# Patient Record
Sex: FEMALE | Race: BLACK OR AFRICAN AMERICAN | NOT HISPANIC OR LATINO
[De-identification: names, ages, dates, MRNs, and addresses within clinical notes are randomized per-mention and may not be internally consistent; named-entity substitution may affect disease eponyms.]

---

## 2019-05-22 ENCOUNTER — RESULT REVIEW (OUTPATIENT)
Age: 54
End: 2019-05-22

## 2019-08-16 ENCOUNTER — RESULT REVIEW (OUTPATIENT)
Age: 54
End: 2019-08-16

## 2019-08-23 ENCOUNTER — EMERGENCY (EMERGENCY)
Facility: HOSPITAL | Age: 54
LOS: 0 days | Discharge: HOME | End: 2019-08-23
Admitting: EMERGENCY MEDICINE
Payer: MEDICARE

## 2019-08-23 VITALS
TEMPERATURE: 97 F | SYSTOLIC BLOOD PRESSURE: 142 MMHG | RESPIRATION RATE: 18 BRPM | DIASTOLIC BLOOD PRESSURE: 78 MMHG | OXYGEN SATURATION: 100 % | WEIGHT: 184.97 LBS | HEART RATE: 78 BPM

## 2019-08-23 DIAGNOSIS — K64.9 UNSPECIFIED HEMORRHOIDS: ICD-10-CM

## 2019-08-23 DIAGNOSIS — K62.89 OTHER SPECIFIED DISEASES OF ANUS AND RECTUM: ICD-10-CM

## 2019-08-23 PROCEDURE — 99282 EMERGENCY DEPT VISIT SF MDM: CPT

## 2019-08-23 RX ORDER — DOCUSATE SODIUM 100 MG
1 CAPSULE ORAL
Qty: 14 | Refills: 0
Start: 2019-08-23 | End: 2019-09-05

## 2019-08-23 RX ORDER — HYDROCORTISONE 1 %
1 OINTMENT (GRAM) TOPICAL
Qty: 30 | Refills: 0
Start: 2019-08-23 | End: 2019-08-29

## 2019-08-23 NOTE — ED PROVIDER NOTE - NS ED ROS FT
Review of Systems:  	•	CONSTITUTIONAL - no fever, no diaphoresis, no chills  	•	SKIN - no rash  	•	HEMATOLOGIC - no bleeding, no bruising  	•	EYES - no eye pain, no blurry vision  	•	ENT - no congestion  	•	RESPIRATORY - no shortness of breath, no cough  	•	CARDIAC - no chest pain, no palpitations  	•	GI - +painful hemorrhoids, no abd pain, no nausea, no vomiting, no diarrhea, no constipation  	•	GENITO-URINARY - no dysuria; no hematuria, no increased urinary frequency  	•	MUSCULOSKELETAL - no joint paint, no swelling, no redness  	•	NEUROLOGIC - no weakness, no paresthesias  	All other ROS are negative except as documented in HPI.

## 2019-08-23 NOTE — ED PROVIDER NOTE - CLINICAL SUMMARY MEDICAL DECISION MAKING FREE TEXT BOX
Pt with painful hemorrhoids over the last few days. No rectal bleeding. Exam showing tender non-thrombosed external hemorrhoids. Lidocaine 4% given in ED for pain relief. Sent home on Anusol and colace. Advised fu with her gastroenterologist. I have discussed the discharge plan with the patient. The patient agrees with the plan, as discussed.  The patient understands Emergency Department diagnosis is a preliminary diagnosis often based on limited information and that the patient must adhere to the follow-up plan as discussed.  The patient understands that if the symptoms worsen or if prescribed medications do not have the desired/planned effect that the patient may return to the Emergency Department at any time for further evaluation and treatment.

## 2019-08-23 NOTE — ED PROVIDER NOTE - OBJECTIVE STATEMENT
55 yo F with no pmhx presenting for painful hemorrhoids over the past few days. Pain is throbbing, constant, non-radiating. Had recent colonoscopy showing hemorrhoids was given suppository which she had not picked up yet. No bloody bowel movements. No cp, sob, fever, chills, abdominal pain, nausea, vomiting, diarrhea, constipation, rectal bleeding, back pain, urinary symptoms, headache, dizziness, paresthesias, or weakness.

## 2019-08-23 NOTE — ED PROVIDER NOTE - PHYSICAL EXAMINATION
VITAL SIGNS: I have reviewed nursing notes and confirm.  CONSTITUTIONAL: Well-developed; well-nourished; in no acute distress.  SKIN: Skin exam is warm and dry, no acute rash.  HEAD: Normocephalic; atraumatic.  EYES: PERRL, EOM intact; conjunctiva and sclera clear.  ENT: No nasal discharge; airway clear.   NECK: Supple; non tender.  CARD: S1, S2 normal; no murmurs, gallops, or rubs. Regular rate and rhythm.  RESP: Clear to auscultation bilaterally. No wheezes, rales or rhonchi.  ABD: Normal bowel sounds; soft; non-distended; non-tender.   RECTAL chaperoned by BONNIE Kent: Multiple non-thrombosed external hemorrhoids +tenderness. +Internal hemorrhoids noted upon digital exam. No blood. No melena.   EXT: Normal ROM. No edema.  LYMPH: No acute cervical adenopathy.  NEURO: Alert, oriented. Grossly unremarkable. No focal deficits.  PSYCH: Cooperative, appropriate.

## 2019-08-23 NOTE — ED ADULT TRIAGE NOTE - CHIEF COMPLAINT QUOTE
pt sts " my doctor told I have external hemorrhoids and they are hurting me a lot". pt denies any bleeding

## 2019-08-23 NOTE — ED PROVIDER NOTE - NSFOLLOWUPINSTRUCTIONS_ED_ALL_ED_FT
Hemorrhoids    WHAT YOU NEED TO KNOW:    Hemorrhoids are swollen blood vessels inside your rectum (internal hemorrhoids) or on your anus (external hemorrhoids). Sometimes a hemorrhoid may prolapse. This means it extends out of your anus.    DISCHARGE INSTRUCTIONS:    Seek care immediately if:     You have severe pain in your rectum or around your anus.      You have severe pain in your abdomen and you are vomiting.       You have bleeding from your anus that soaks through your underwear.     Contact your healthcare provider if:     You have frequent and painful bowel movements.      Your hemorrhoid looks or feels more swollen than usual.       You do not have a bowel movement for 2 days or more.       You see or feel tissue coming through your anus.       You have questions or concerns about your condition or care.    Medicines: You may need any of the following:     Medicine may be given to decrease pain, swelling, and itching. The medicine may come as a pad, cream, or ointment.       Stool softeners help treat or prevent constipation.       NSAIDs, such as ibuprofen, help decrease swelling, pain, and fever. NSAIDs can cause stomach bleeding or kidney problems in certain people. If you take blood thinner medicine, always ask your healthcare provider if NSAIDs are safe for you. Always read the medicine label and follow directions.      Take your medicine as directed. Contact your healthcare provider if you think your medicine is not helping or if you have side effects. Tell him or her if you are allergic to any medicine. Keep a list of the medicines, vitamins, and herbs you take. Include the amounts, and when and why you take them. Bring the list or the pill bottles to follow-up visits. Carry your medicine list with you in case of an emergency.    Manage your symptoms:     Apply ice on your anus for 15 to 20 minutes every hour or as directed. Use an ice pack, or put crushed ice in a plastic bag. Cover it with a towel before you apply it to your anus. Ice helps prevent tissue damage and decreases swelling and pain.      Take a sitz bath. Fill a bathtub with 4 to 6 inches of warm water. You may also use a sitz bath pan that fits inside a toilet bowl. Sit in the sitz bath for 15 minutes. Do this 3 times a day, and after each bowel movement. The warm water can help decrease pain and swelling.       Keep your anal area clean. Gently wash the area with warm water daily. Soap may irritate the area. After a bowel movement, wipe with moist towelettes or wet toilet paper. Dry toilet paper can irritate the area.     Prevent hemorrhoids:     Do not strain to have a bowel movement. Do not sit on the toilet too long. These actions can increase pressure on the tissues in your rectum and anus.       Drink plenty of liquids. Liquids can help prevent constipation. Ask how much liquid to drink each day and which liquids are best for you.       Eat a variety of high-fiber foods. Examples include fruits, vegetables, and whole grains. Ask your healthcare provider how much fiber you need each day. You may need to take a fiber supplement.            Exercise as directed. Exercise, such as walking, may make it easier to have a bowel movement. Ask your healthcare provider to help you create an exercise plan.       Do not have anal sex. Anal sex can weaken the skin around your rectum and anus.       Avoid heavy lifting. This can cause straining and increase your risk for another hemorrhoid.     Follow up with your healthcare provider as directed: Write down your questions so you remember to ask them during your visits.

## 2019-09-23 ENCOUNTER — INPATIENT (INPATIENT)
Facility: HOSPITAL | Age: 54
LOS: 3 days | Discharge: HOME | End: 2019-09-27
Attending: INTERNAL MEDICINE | Admitting: INTERNAL MEDICINE
Payer: MEDICARE

## 2019-09-23 VITALS
HEART RATE: 83 BPM | TEMPERATURE: 97 F | OXYGEN SATURATION: 100 % | RESPIRATION RATE: 18 BRPM | SYSTOLIC BLOOD PRESSURE: 135 MMHG | DIASTOLIC BLOOD PRESSURE: 67 MMHG

## 2019-09-23 LAB
ALBUMIN SERPL ELPH-MCNC: 3.2 G/DL — LOW (ref 3.5–5.2)
ALP SERPL-CCNC: 335 U/L — HIGH (ref 30–115)
ALT FLD-CCNC: 811 U/L — HIGH (ref 0–41)
ANION GAP SERPL CALC-SCNC: 9 MMOL/L — SIGNIFICANT CHANGE UP (ref 7–14)
APPEARANCE UR: ABNORMAL
APTT BLD: 19.6 SEC — CRITICAL LOW (ref 27–39.2)
AST SERPL-CCNC: 1371 U/L — HIGH (ref 0–41)
BACTERIA # UR AUTO: ABNORMAL
BASE EXCESS BLDV CALC-SCNC: 1.8 MMOL/L — SIGNIFICANT CHANGE UP (ref -2–2)
BILIRUB SERPL-MCNC: 3.2 MG/DL — HIGH (ref 0.2–1.2)
BILIRUB UR-MCNC: ABNORMAL
BUN SERPL-MCNC: 11 MG/DL — SIGNIFICANT CHANGE UP (ref 10–20)
CA-I SERPL-SCNC: 1.2 MMOL/L — SIGNIFICANT CHANGE UP (ref 1.12–1.3)
CALCIUM SERPL-MCNC: 9.5 MG/DL — SIGNIFICANT CHANGE UP (ref 8.5–10.1)
CHLORIDE SERPL-SCNC: 102 MMOL/L — SIGNIFICANT CHANGE UP (ref 98–110)
CO2 SERPL-SCNC: 23 MMOL/L — SIGNIFICANT CHANGE UP (ref 17–32)
COLOR SPEC: ABNORMAL
CREAT SERPL-MCNC: 1 MG/DL — SIGNIFICANT CHANGE UP (ref 0.7–1.5)
DIFF PNL FLD: NEGATIVE — SIGNIFICANT CHANGE UP
EPI CELLS # UR: 9 /HPF — HIGH (ref 0–5)
GAS PNL BLDV: 139 MMOL/L — SIGNIFICANT CHANGE UP (ref 136–145)
GAS PNL BLDV: SIGNIFICANT CHANGE UP
GLUCOSE SERPL-MCNC: 91 MG/DL — SIGNIFICANT CHANGE UP (ref 70–99)
GLUCOSE UR QL: NEGATIVE — SIGNIFICANT CHANGE UP
HCG SERPL QL: NEGATIVE — SIGNIFICANT CHANGE UP
HCO3 BLDV-SCNC: 28 MMOL/L — SIGNIFICANT CHANGE UP (ref 22–29)
HCT VFR BLD CALC: 37.4 % — SIGNIFICANT CHANGE UP (ref 37–47)
HCT VFR BLDA CALC: 41.2 % — SIGNIFICANT CHANGE UP (ref 34–44)
HGB BLD CALC-MCNC: 13.4 G/DL — LOW (ref 14–18)
HGB BLD-MCNC: 11.7 G/DL — LOW (ref 12–16)
HYALINE CASTS # UR AUTO: 5 /LPF — SIGNIFICANT CHANGE UP (ref 0–7)
INR BLD: 1.02 RATIO — SIGNIFICANT CHANGE UP (ref 0.65–1.3)
KETONES UR-MCNC: ABNORMAL
LACTATE BLDV-MCNC: 2.1 MMOL/L — HIGH (ref 0.5–1.6)
LEUKOCYTE ESTERASE UR-ACNC: NEGATIVE — SIGNIFICANT CHANGE UP
LIDOCAIN IGE QN: 25 U/L — SIGNIFICANT CHANGE UP (ref 7–60)
MCHC RBC-ENTMCNC: 21.2 PG — LOW (ref 27–31)
MCHC RBC-ENTMCNC: 31.3 G/DL — LOW (ref 32–37)
MCV RBC AUTO: 67.9 FL — LOW (ref 81–99)
NITRITE UR-MCNC: NEGATIVE — SIGNIFICANT CHANGE UP
NRBC # BLD: 0 /100 WBCS — SIGNIFICANT CHANGE UP (ref 0–0)
PCO2 BLDV: 48 MMHG — SIGNIFICANT CHANGE UP (ref 41–51)
PH BLDV: 7.37 — SIGNIFICANT CHANGE UP (ref 7.26–7.43)
PH UR: 6 — SIGNIFICANT CHANGE UP (ref 5–8)
PLATELET # BLD AUTO: 253 K/UL — SIGNIFICANT CHANGE UP (ref 130–400)
PO2 BLDV: 14 MMHG — LOW (ref 20–40)
POTASSIUM BLDV-SCNC: 4.1 MMOL/L — SIGNIFICANT CHANGE UP (ref 3.3–5.6)
POTASSIUM SERPL-MCNC: 6.8 MMOL/L — CRITICAL HIGH (ref 3.5–5)
POTASSIUM SERPL-SCNC: 6.8 MMOL/L — CRITICAL HIGH (ref 3.5–5)
PROT SERPL-MCNC: 7.6 G/DL — SIGNIFICANT CHANGE UP (ref 6–8)
PROT UR-MCNC: ABNORMAL
PROTHROM AB SERPL-ACNC: 11.7 SEC — SIGNIFICANT CHANGE UP (ref 9.95–12.87)
RBC # BLD: 5.51 M/UL — HIGH (ref 4.2–5.4)
RBC # FLD: 21.7 % — HIGH (ref 11.5–14.5)
RBC CASTS # UR COMP ASSIST: 0 /HPF — SIGNIFICANT CHANGE UP (ref 0–4)
SAO2 % BLDV: 14 % — SIGNIFICANT CHANGE UP
SODIUM SERPL-SCNC: 134 MMOL/L — LOW (ref 135–146)
SP GR SPEC: 1.02 — SIGNIFICANT CHANGE UP (ref 1.01–1.02)
TROPONIN T SERPL-MCNC: <0.01 NG/ML — SIGNIFICANT CHANGE UP
UROBILINOGEN FLD QL: ABNORMAL
WBC # BLD: 5.12 K/UL — SIGNIFICANT CHANGE UP (ref 4.8–10.8)
WBC # FLD AUTO: 5.12 K/UL — SIGNIFICANT CHANGE UP (ref 4.8–10.8)
WBC UR QL: 2 /HPF — SIGNIFICANT CHANGE UP (ref 0–5)

## 2019-09-23 PROCEDURE — 71046 X-RAY EXAM CHEST 2 VIEWS: CPT | Mod: 26

## 2019-09-23 PROCEDURE — 74177 CT ABD & PELVIS W/CONTRAST: CPT | Mod: 26

## 2019-09-23 PROCEDURE — 99285 EMERGENCY DEPT VISIT HI MDM: CPT

## 2019-09-23 PROCEDURE — 93010 ELECTROCARDIOGRAM REPORT: CPT

## 2019-09-23 RX ORDER — LOSARTAN POTASSIUM 100 MG/1
50 TABLET, FILM COATED ORAL DAILY
Refills: 0 | Status: DISCONTINUED | OUTPATIENT
Start: 2019-09-23 | End: 2019-09-27

## 2019-09-23 RX ORDER — CHLORHEXIDINE GLUCONATE 213 G/1000ML
1 SOLUTION TOPICAL
Refills: 0 | Status: DISCONTINUED | OUTPATIENT
Start: 2019-09-23 | End: 2019-09-27

## 2019-09-23 RX ORDER — SODIUM CHLORIDE 9 MG/ML
1000 INJECTION INTRAMUSCULAR; INTRAVENOUS; SUBCUTANEOUS ONCE
Refills: 0 | Status: COMPLETED | OUTPATIENT
Start: 2019-09-23 | End: 2019-09-23

## 2019-09-23 RX ORDER — ENOXAPARIN SODIUM 100 MG/ML
40 INJECTION SUBCUTANEOUS DAILY
Refills: 0 | Status: DISCONTINUED | OUTPATIENT
Start: 2019-09-23 | End: 2019-09-27

## 2019-09-23 RX ADMIN — SODIUM CHLORIDE 1000 MILLILITER(S): 9 INJECTION INTRAMUSCULAR; INTRAVENOUS; SUBCUTANEOUS at 14:19

## 2019-09-23 NOTE — H&P ADULT - ASSESSMENT
# Hepatocellular transaminitis:  - AST 1371, , , Total Bili 3.2 with no differential (Sample is hemolyzed)   - will get repeat hepatic panel in the AM lab and trend daily   - preserved hepatic synthetic function with INR of 1 and albumin 3.2 53 yo F w PMhx of HTN and pre DM presents to the ED for an episode of nausea, diaphoresis, and light-headedness.    # Fatigue in context of Hepatocellular transaminitis:  - AST 1371, , , Total Bili 3.2 with no differential (Sample is hemolyzed)   - CT abdomen with no acute pathology  - preserved hepatic synthetic function with INR of 1 and albumin 3.2  - pt with normal liver function test in 6/2019.  - pt denies recent travel, over counter medication use, IV drug use. pt is monogamous with 1 sexual partner with no protection  - pt endorses occasional Tylenol use but not in excess or n daily bases   - will get repeat hepatic panel in the AM lab and trend daily   - will get hepatitis panel, toxicology screen, acetaminophen level, ferratin,  Autoimmune markers (antinuclear antibodies, anti-smooth muscle antibodies, anti-liver/kidney microsomal antibodies type 1, IgG)  - will get US RUQ With doppler   - GI consult (Dr. Michel Elizabeth)     # HTN: pt take olmesartan 20 mg daily will give losartan 50 mg daily     # DVT ppx: Lovenox  # GI ppx: not indicated  # regular diet  # Dispo: from home

## 2019-09-23 NOTE — H&P ADULT - NSHPPHYSICALEXAM_GEN_ALL_CORE
GENERAL: NAD, lying in bed comfortably  HEAD:  Atraumatic, Normocephalic  EYES: EOMI, PERRLA, conjunctiva and sclera clear  ENT: Moist mucous membranes  NECK: Supple, No JVD  CHEST/LUNG: Clear to auscultation bilaterally; No rales, rhonchi, wheezing, or rubs. Unlabored respirations  HEART: Regular rate and rhythm; No murmurs, rubs, or gallops  ABDOMEN: Bowel sounds present; Soft, Nontender, Nondistended. No hepatomegally  EXTREMITIES:  2+ Peripheral Pulses, brisk capillary refill. No clubbing, cyanosis, or edema  NERVOUS SYSTEM:  Alert & Oriented X3, speech clear. No deficits   MSK: FROM all 4 extremities, full and equal strength  SKIN: No rashes or lesions GENERAL: NAD, lying in bed comfortably  HEAD:  Atraumatic, Normocephalic  EYES: EOMI, PERRLA, mild scleral jaundice   ENT: Moist mucous membranes  NECK: Supple, No JVD  CHEST/LUNG: Clear to auscultation bilaterally; No rales, rhonchi, wheezing, or rubs. Unlabored respirations  HEART: Regular rate and rhythm; No murmurs, rubs, or gallops  ABDOMEN: Bowel sounds present; Soft, Nontender, Nondistended. No hepatomegaly  EXTREMITIES:  2+ Peripheral Pulses, brisk capillary refill. No clubbing, cyanosis, or edema  NERVOUS SYSTEM:  Alert & Oriented X3, speech clear. No deficits   MSK: FROM all 4 extremities, full and equal strength  SKIN: No rashes or lesions

## 2019-09-23 NOTE — ED PROVIDER NOTE - PHYSICAL EXAMINATION
CONSTITUTIONAL: Well-developed; well-nourished; in no acute distress.   SKIN: warm, dry.  HEAD: Normocephalic; atraumatic.  EYES: PERRL, EOMI, no conjunctival erythema; mild scleral icterus.   ENT: No nasal discharge; airway clear.  NECK: Supple; non tender.  CARD: S1, S2 normal; no murmurs, gallops, or rubs. Regular rate and rhythm.   RESP: No wheezes, rales or rhonchi.  ABD: soft nondistended, nontender to palpation.  EXT: Normal ROM.  No clubbing, cyanosis or edema.   NEURO: Alert, oriented, grossly unremarkable.  PSYCH: Cooperative, appropriate.

## 2019-09-23 NOTE — H&P ADULT - HISTORY OF PRESENT ILLNESS
Patient is a 55 yo F w/ hx of HTN p/w episodes of nausea, diaphoresis, and light-headedness. Patient states she has had 2 episodes of sudden onset light-headedness, nausea, and diaphoresis in past 2 weeks without exacerbating events; no chest pain or palpitations during events and tea-colored urine during these episodes. Patient also has had newly diagnosed elevated LFTs as outpatient; following with outpatient GI but does not have a diagnosis as of yet. Patient states she has had urinary frequency, but no fevers or abdominal pain. 53 yo F w PMhx of HTN and pre DM presents to the ED for an episode of nausea, diaphoresis, and light-headedness. Patient reports an episode of sudden onset light-headedness, nausea, and diaphoresis today while ironing her clothes; she had another episode last week with the same complains. no orthostatic hypotension, not exacerbated by head movements. she also endorses fatigue decreased po intake with occasional nausea over the past few weeks. pt also reports dark urine but normal colored stool. pt endorses she newly diagnosed elevated LFTs as outpatient; with work up still in progress. pt denies recent travel, over counter medication use, IV drug use. pt is monogamous with 1 sexual partner with no protection. pt also reports 1 year ago she was seen by rheumatologist after she was referred by ophthalmologist for red eye and working diagnosis was mixed connective tissue disease. pt didn't follow up and currently takes no medications. pt had colonoscopy 8/2019 with 2 polyps and hemorrhoids and was told to follow up in 3 years. pt sees Dr. Glenn Pearson GI.      in the ED, pt is hemodynamically stable.  initial blood work with transaminitis.  CT abdomen with no acute pathology.

## 2019-09-23 NOTE — ED ADULT NURSE NOTE - OBJECTIVE STATEMENT
Pt c/o episodes of dizziness, lightheadedness, and dark urine, pt endorses she was seen at PMD office and had labs done which showered elevated LFTs. Denies cp, sob, abd pain, n/v/d, urinary symptoms, fevers, chills, back pain.

## 2019-09-23 NOTE — ED PROVIDER NOTE - NS ED ROS FT
Constitutional: No fevers.   Eyes:  No visual changes, eye pain or discharge.  ENMT:  No hearing changes, pain, no sore throat or runny nose, no difficulty swallowing  Cardiac:  No chest pain, SOB or edema. No chest pain with exertion. +diaphoresis +light-headedness.   Respiratory:  No cough or respiratory distress. No hemoptysis. No history of asthma or RAD.  GI:  +nausea. No vomiting, diarrhea or abdominal pain. +elevated LFTs.   :  No dysuria, frequency or burning.  MS:  No myalgia, muscle weakness, joint pain or back pain.  Neuro:  No headache or weakness.  No LOC.  Skin:  No skin rash.   Endocrine: No history of thyroid disease or diabetes.

## 2019-09-23 NOTE — ED PROVIDER NOTE - CLINICAL SUMMARY MEDICAL DECISION MAKING FREE TEXT BOX
Patient presented with pre-syncopal symptoms, as well as transaminitis found as outpatient of unclear etiology. Otherwise afebrile, HD stable. Obtained repeat labs which confirmed transaminitis. CT abd/pelvis negative for any emergent causes of transaminitis. In terms of pre-syncope, obtained EKG which showed no signs of cardiac ischemia. Troponin negative and labs otherwise grossly unremarkable. Patient remained stable during ED course. However, will require admission for severe transaminitis along with further work up for her pre-syncope. Patient and family agreeable with plan.

## 2019-09-23 NOTE — ED PROVIDER NOTE - OBJECTIVE STATEMENT
Patient is a 53 yo F w/ hx of HTN p/w episodes of nausea, diaphoresis, and light-headedness. Patient states she has had 2 episodes of sudden onset light-headedness, nausea, and diaphoresis in past 2 weeks without exacerbating events; no chest pain or palpitations during events and tea-colored urine during these episodes. Patient also has had newly diagnosed elevated LFTs as outpatient; following with outpatient GI but does not have a diagnosis as of yet. Patient states she has had urinary frequency, but no fevers or abdominal pain.

## 2019-09-23 NOTE — ED PROVIDER NOTE - ATTENDING CONTRIBUTION TO CARE
55 yo f who had outpatient labs done last week showing markedly elevated LFTs.  Pt was having dark color urine and having episodes of lightheadedness so went to the doctor.  doctor did labs showing elevated LFTS.  no cp, no sob, no abd pain.  no back pain.  no dysuria.  no fevers, no chills.  no dizziness.  no syncope.  awake, alert.  neck supple.  abd soft, nontender.  lungs clear. MMM.  EOMI.  motor/sensation intact in all 4 ext.    p: labs, ekg, cxr, ct, reassess, likely admission.  pt denies any tylenol use.

## 2019-09-23 NOTE — H&P ADULT - ATTENDING COMMENTS
53 YO F with a PMH of HTN and pre-DM who presents to the hospital with a c/o diaphoresis, SOB, and pre-syncope while getting ready for work this AM. The pt states she has had several episodes over the past couple weeks. Associated with dark brown urine. Denies any dysuria, ABD pain, N/V/D, fevers, chills, CP, palpitations, or headaches. No APAP use. Adamantly denies excessive EtOH use (1 wine/weekly). There is a remote hx of right eye redness x 1 year ago for which she was eventully diagnosed with mixed connective-tissue disease and was told that it was self-resolving and she didn't need any treatment.  In the ED, the pt was noted to have elevated LFTS. CT-AP was negative for acute disease.     Of note, the pt is currently being worked up by her PCP (Michel) as out-pt for same. Seen by GI and gave blood work on 9/18 but has not received results.     ROS as above, otherwise neg    Vital Signs Last 24 Hrs  T(C): 36.4 (23 Sep 2019 11:09), Max: 36.4 (23 Sep 2019 11:09)  T(F): 97.6 (23 Sep 2019 11:09), Max: 97.6 (23 Sep 2019 11:09)  HR: 92 (23 Sep 2019 11:09) (83 - 92)  BP: 135/67 (23 Sep 2019 11:09) (135/67 - 135/67)  BP(mean): --  RR: 16 (23 Sep 2019 11:09) (16 - 18)  SpO2: 98% (23 Sep 2019 11:09) (98% - 100%)    General: WN/WD NAD  Neurology: A&Ox3, nonfocal, LOPES x 4  Eyes: PERRL/EOMI  ENT/Neck: Neck supple, trachea midline, No JVD  Respiratory: CTA B/L, No wheezing, rales, rhonchi  CV: RRR, S1S2, No M/G/R  Abdominal: Soft, NT, ND +BS, Obesity  Extremities: No edema, + peripheral pulses  Skin: No Rashes, Hematoma, Ecchymosis    Labs and radiology as above    A/P:    1. Transaminitis from unclear cause  -GI consult   -Send hepatitis work-up  -Agree with toxicology screen, APAP level, ferratin, Autoimmune markers (antinuclear antibodies, anti-smooth muscle antibodies, anti-liver/kidney microsomal antibodies type 1, IgG)  -RUQ US  -Speak with out-pt providers  -IVFs  -Trend liver enzymes    2. Hyperkalemia, hemolyzed sample  -Repeat BMP    3. Hx of HTN and pre-DM  -C/w home meds    GI and DVT PPX    Rest as per above note

## 2019-09-23 NOTE — H&P ADULT - NSHPLABSRESULTS_GEN_ALL_CORE
Complete Blood Count (09.23.19 @ 14:00)    Nucleated RBC: 0 /100 WBCs    WBC Count: 5.12 K/uL    RBC Count: 5.51 M/uL    Hemoglobin: 11.7 g/dL    Hematocrit: 37.4 %    Mean Cell Volume: 67.9 fL    Mean Cell Hemoglobin: 21.2 pg    Mean Cell Hemoglobin Conc: 31.3 g/dL    Red Cell Distrib Width: 21.7 %    Platelet Count - Automated: 253 K/uL    Comprehensive Metabolic Panel (09.23.19 @ 13:10)    Sodium, Serum: 134 mmol/L    Potassium, Serum: 6.8: Specimen is grossly hemolyzed. mmol/L    Chloride, Serum: 102 mmol/L    Carbon Dioxide, Serum: 23 mmol/L    Anion Gap, Serum: 9 mmol/L    Blood Urea Nitrogen, Serum: 11 mg/dL    Creatinine, Serum: 1.0 mg/dL    Glucose, Serum: 91 mg/dL    Calcium, Total Serum: 9.5 mg/dL    Protein Total, Serum: 7.6 g/dL    Albumin, Serum: 3.2 g/dL    Bilirubin Total, Serum: 3.2 mg/dL    Alkaline Phosphatase, Serum: 335: Hemolyzed. Interpret with caution U/L    Aspartate Aminotransferase (AST/SGOT): 1371: Hemolyzed. Interpret with caution U/L    Alanine Aminotransferase (ALT/SGPT): 811: Hemolyzed. Interpret with caution U/L    eGFR if Non : 63: Interpretative comment  The units for eGFR are mL/min/1.73M2 (normalized body surface area). The  eGFR is calculated from a serum creatinine using the CKD-EPI equation.  Other variables required for calculation are race, age and sex. Among  patients with chronic kidney disease (CKD), the eGFR is useful in  determining the stage of disease according to KDOQI CKD classification.  All eGFR results are reported numerically with the following  interpretation.          GFR                    With                 Without     (ml/min/1.73 m2)    Kidney Damage       Kidney Damage        >= 90                    Stage 1                     Normal        60-89                    Stage 2                     Decreased GFR        30-59     Stage 3                     Stage 3        15-29                    Stage 4                     Stage 4        < 15                      Stage 5                     Stage 5  Each stage of CKD assumes that the associated GFR level has been in  effect for at least 3 months. Determination of stages one and two (with  eGFR > 59 ml/min/m2) requires estimation of kidney damage for at least 3  months as defined by structural or functional abnormalities.  Limitations: All estimates of GFR will be less accurate for patients at  extremes of muscle mass (including but not limited to frail elderly,  critically ill, or cancer patients), those with unusual diets, and those  with conditions associated with reduced secretion or extrarenal  elimination of creatinine. The eGFR equation is not recommended for use  in patients with unstable creatinine levels. mL/min/1.73M2    eGFR if African American: 73 mL/min/1.73M2    < from: CT Abdomen and Pelvis w/ IV Cont (09.23.19 @ 17:07) >    IMPRESSION:    No CT evidence of any acute inflammatory process within the abdomen or   pelvis.     Large bulky fibroid uterus, measuring 9.7 x 8.4 x 11.3 cm.    < end of copied text >

## 2019-09-24 LAB
ALBUMIN SERPL ELPH-MCNC: 3.1 G/DL — LOW (ref 3.5–5.2)
ALP SERPL-CCNC: 327 U/L — HIGH (ref 30–115)
ALT FLD-CCNC: 687 U/L — HIGH (ref 0–41)
AMPHET UR-MCNC: NEGATIVE — SIGNIFICANT CHANGE UP
ANION GAP SERPL CALC-SCNC: 10 MMOL/L — SIGNIFICANT CHANGE UP (ref 7–14)
AST SERPL-CCNC: 1211 U/L — HIGH (ref 0–41)
BARBITURATES UR SCN-MCNC: NEGATIVE — SIGNIFICANT CHANGE UP
BENZODIAZ UR-MCNC: NEGATIVE — SIGNIFICANT CHANGE UP
BILIRUB DIRECT SERPL-MCNC: 1.9 MG/DL — HIGH (ref 0–0.2)
BILIRUB INDIRECT FLD-MCNC: 0.7 MG/DL — SIGNIFICANT CHANGE UP (ref 0.2–1.2)
BILIRUB SERPL-MCNC: 2.6 MG/DL — HIGH (ref 0.2–1.2)
BUN SERPL-MCNC: 12 MG/DL — SIGNIFICANT CHANGE UP (ref 10–20)
CALCIUM SERPL-MCNC: 8.8 MG/DL — SIGNIFICANT CHANGE UP (ref 8.5–10.1)
CHLORIDE SERPL-SCNC: 103 MMOL/L — SIGNIFICANT CHANGE UP (ref 98–110)
CHOLEST SERPL-MCNC: 163 MG/DL — SIGNIFICANT CHANGE UP (ref 100–200)
CO2 SERPL-SCNC: 22 MMOL/L — SIGNIFICANT CHANGE UP (ref 17–32)
COCAINE METAB.OTHER UR-MCNC: NEGATIVE — SIGNIFICANT CHANGE UP
CREAT SERPL-MCNC: 0.8 MG/DL — SIGNIFICANT CHANGE UP (ref 0.7–1.5)
FERRITIN SERPL-MCNC: 79 NG/ML — SIGNIFICANT CHANGE UP (ref 15–150)
GLUCOSE BLDC GLUCOMTR-MCNC: 83 MG/DL — SIGNIFICANT CHANGE UP (ref 70–99)
GLUCOSE SERPL-MCNC: 93 MG/DL — SIGNIFICANT CHANGE UP (ref 70–99)
HAV IGM SER-ACNC: SIGNIFICANT CHANGE UP
HBV CORE IGM SER-ACNC: SIGNIFICANT CHANGE UP
HBV SURFACE AG SER-ACNC: SIGNIFICANT CHANGE UP
HCV AB S/CO SERPL IA: 0.14 S/CO — SIGNIFICANT CHANGE UP (ref 0–0.99)
HCV AB SERPL-IMP: SIGNIFICANT CHANGE UP
HDLC SERPL-MCNC: 27 MG/DL — LOW
LIPID PNL WITH DIRECT LDL SERPL: 110 MG/DL — SIGNIFICANT CHANGE UP (ref 4–129)
MAGNESIUM SERPL-MCNC: 2 MG/DL — SIGNIFICANT CHANGE UP (ref 1.8–2.4)
METHADONE UR-MCNC: NEGATIVE — SIGNIFICANT CHANGE UP
OPIATES UR-MCNC: NEGATIVE — SIGNIFICANT CHANGE UP
PCP SPEC-MCNC: SIGNIFICANT CHANGE UP
POTASSIUM SERPL-MCNC: 4.3 MMOL/L — SIGNIFICANT CHANGE UP (ref 3.5–5)
POTASSIUM SERPL-SCNC: 4.3 MMOL/L — SIGNIFICANT CHANGE UP (ref 3.5–5)
PROPOXYPHENE QUALITATIVE URINE RESULT: NEGATIVE — SIGNIFICANT CHANGE UP
PROT SERPL-MCNC: 6.7 G/DL — SIGNIFICANT CHANGE UP (ref 6–8)
SODIUM SERPL-SCNC: 135 MMOL/L — SIGNIFICANT CHANGE UP (ref 135–146)
TOTAL CHOLESTEROL/HDL RATIO MEASUREMENT: 6 RATIO — HIGH (ref 4–5.5)
TRIGL SERPL-MCNC: 116 MG/DL — SIGNIFICANT CHANGE UP (ref 10–149)

## 2019-09-24 PROCEDURE — 99222 1ST HOSP IP/OBS MODERATE 55: CPT | Mod: AI

## 2019-09-24 PROCEDURE — 76705 ECHO EXAM OF ABDOMEN: CPT | Mod: 26

## 2019-09-24 PROCEDURE — 99222 1ST HOSP IP/OBS MODERATE 55: CPT

## 2019-09-24 RX ORDER — SODIUM CHLORIDE 9 MG/ML
1000 INJECTION INTRAMUSCULAR; INTRAVENOUS; SUBCUTANEOUS
Refills: 0 | Status: DISCONTINUED | OUTPATIENT
Start: 2019-09-24 | End: 2019-09-27

## 2019-09-24 RX ORDER — PANTOPRAZOLE SODIUM 20 MG/1
40 TABLET, DELAYED RELEASE ORAL
Refills: 0 | Status: DISCONTINUED | OUTPATIENT
Start: 2019-09-24 | End: 2019-09-27

## 2019-09-24 RX ADMIN — LOSARTAN POTASSIUM 50 MILLIGRAM(S): 100 TABLET, FILM COATED ORAL at 06:00

## 2019-09-24 RX ADMIN — SODIUM CHLORIDE 100 MILLILITER(S): 9 INJECTION INTRAMUSCULAR; INTRAVENOUS; SUBCUTANEOUS at 20:38

## 2019-09-24 RX ADMIN — CHLORHEXIDINE GLUCONATE 1 APPLICATION(S): 213 SOLUTION TOPICAL at 06:00

## 2019-09-24 NOTE — CONSULT NOTE ADULT - SUBJECTIVE AND OBJECTIVE BOX
INTERVENTIONAL RADIOLOGY CONSULT:     Procedure Requested: Nontargeted Liver Biopsy    HPI:  53 yo F w PMhx of HTN and pre DM presents to the ED for an episode of nausea, diaphoresis, and light-headedness. Patient endorses newly diagnosed elevated LFTs as outpatient; with work up still in progress. 1 year ago she was seen by rheumatologist after she was referred by ophthalmologist for red eye and working diagnosis was mixed connective tissue disease.     PAST MEDICAL & SURGICAL HISTORY:  Pre-diabetes  Hypertension  No significant past surgical history    MEDICATIONS  (STANDING):  chlorhexidine 4% Liquid 1 Application(s) Topical <User Schedule>  enoxaparin Injectable 40 milliGRAM(s) SubCutaneous daily  losartan 50 milliGRAM(s) Oral daily    MEDICATIONS  (PRN):      Allergies    tetracycline (Rash)    FAMILY HISTORY:  No pertinent family history in first degree relatives    Physical Exam:   Vital Signs Last 24 Hrs  T(C): 36.1 (24 Sep 2019 08:07), Max: 36.5 (24 Sep 2019 01:00)  T(F): 97 (24 Sep 2019 08:07), Max: 97.7 (24 Sep 2019 01:00)  HR: 70 (24 Sep 2019 08:07) (68 - 74)  BP: 125/70 (24 Sep 2019 08:07) (123/69 - 125/70)  BP(mean): --  RR: 18 (24 Sep 2019 08:07) (18 - 18)  SpO2: 99% (24 Sep 2019 08:07) (99% - 100%)    Labs:                         11.7   5.12  )-----------( 253      ( 23 Sep 2019 14:00 )             37.4     09-24    135  |  103  |  12  ----------------------------<  93  4.3   |  22  |  0.8    Ca    8.8      24 Sep 2019 04:30  Mg     2.0     09-24    TPro  6.7  /  Alb  3.1<L>  /  TBili  2.6<H>  /  DBili  1.9<H>  /  AST  1211<H>  /  ALT  687<H>  /  AlkPhos  327<H>  09-24    PT/INR - ( 23 Sep 2019 12:00 )   PT: 11.70 sec;   INR: 1.02 ratio         PTT - ( 23 Sep 2019 12:00 )  PTT:19.6 sec    Pertinent labs:                      11.7   5.12  )-----------( 253      ( 23 Sep 2019 14:00 )             37.4       09-24    135  |  103  |  12  ----------------------------<  93  4.3   |  22  |  0.8    Ca    8.8      24 Sep 2019 04:30  Mg     2.0     09-24    TPro  6.7  /  Alb  3.1<L>  /  TBili  2.6<H>  /  DBili  1.9<H>  /  AST  1211<H>  /  ALT  687<H>  /  AlkPhos  327<H>  09-24    PT/INR - ( 23 Sep 2019 12:00 )   PT: 11.70 sec;   INR: 1.02 ratio      PTT - ( 23 Sep 2019 12:00 )  PTT:19.6 sec    Radiology & Additional Studies:   < from: CT Abdomen and Pelvis w/ IV Cont (09.23.19 @ 17:07) >  IMPRESSION:    No CT evidence of any acute inflammatory process within the abdomen or   pelvis.     Large bulky fibroid uterus, measuring 9.7 x 8.4 x 11.3 cm.    < end of copied text >    < from: US Abdomen Limited (09.24.19 @ 06:49) >  IMPRESSION:    No cholelithiasis or sonographic evidence of acute cholecystitis. No   biliary ductal dilatation.    < end of copied text >    Radiology imaging reviewed.       ASSESSMENT/ PLAN:   53 yo F w PMhx of HTN and pre DM presents to the ED for an episode of nausea, diaphoresis, and light-headedness. Patient endorses newly diagnosed elevated LFTs as outpatient; with work up still in progress. 1 year ago she was seen by rheumatologist after she was referred by ophthalmologist for red eye and working diagnosis was mixed connective tissue disease. Patient was found to have transaminitis.  - IR was consulted for nontargeted liver biopsy for etiology of tramsaminitis  - On schedule for US-guided liver biopsy tomorrow, 9/25  - Please keep patient NPO past midnight    Thank you for the courtesy of this consult, please call [phone] with any further questions.

## 2019-09-24 NOTE — PROGRESS NOTE ADULT - SUBJECTIVE AND OBJECTIVE BOX
DANNA PERERA  54y, Female  Allergy: tetracycline (Rash)    Hospital Day: 1d    Patient seen and examined earlier today.     PMH/PSH:  PAST MEDICAL & SURGICAL HISTORY:  Pre-diabetes  Hypertension        LAST 24-Hr EVENTS:    VITALS:  T(F): 97 (19 @ 08:07), Max: 97.7 (19 @ 01:00)  HR: 70 (19 @ 08:07)  BP: 125/70 (19 @ 08:07) (123/69 - 125/70)  RR: 18 (19 @ 08:07)  SpO2: 99% (19 @ 08:07)    TESTS & MEASUREMENTS:  Weight (Kg):                             11.7   5.12  )-----------( 253      ( 23 Sep 2019 14:00 )             37.4     PT/INR - ( 23 Sep 2019 12:00 )   PT: 11.70 sec;   INR: 1.02 ratio         PTT - ( 23 Sep 2019 12:00 )  PTT:19.6 sec      135  |  103  |  12  ----------------------------<  93  4.3   |  22  |  0.8    Ca    8.8      24 Sep 2019 04:30  Mg     2.0         TPro  6.7  /  Alb  3.1<L>  /  TBili  2.6<H>  /  DBili  1.9<H>  /  AST  1211<H>  /  ALT  687<H>  /  AlkPhos  327<H>      LIVER FUNCTIONS - ( 24 Sep 2019 04:30 )  Alb: 3.1 g/dL / Pro: 6.7 g/dL / ALK PHOS: 327 U/L / ALT: 687 U/L / AST: 1211 U/L / GGT: x           CARDIAC MARKERS ( 23 Sep 2019 13:10 )  x     / <0.01 ng/mL / x     / x     / x            Urinalysis Basic - ( 23 Sep 2019 14:40 )    Color: Filomena / Appearance: Slightly Turbid / S.022 / pH: x  Gluc: x / Ketone: Small  / Bili: Small / Urobili: 6 mg/dL   Blood: x / Protein: 30 mg/dL / Nitrite: Negative   Leuk Esterase: Negative / RBC: 0 /HPF / WBC 2 /HPF   Sq Epi: x / Non Sq Epi: 9 /HPF / Bacteria: Few        RADIOLOGY & ADDITIONAL TESTS:    EXAM:  CT ABDOMEN AND PELVIS IC        PROCEDURE DATE:  2019    IMPRESSION:    No CT evidence of any acute inflammatory process within the abdomen or   pelvis.     Large bulky fibroid uterus, measuring 9.7 x 8.4 x 11.3 cm.      EXAM:  US ABDOMEN LIMITED        PROCEDURE DATE:  2019   FINDINGS:  LIVER:  Normal in contour and echogenicity measuring 12.2 cm in length.   No focal mass.    GALLBLADDER/BILIARY TREE:  No evidence of cholelithiasis. No wall   thickening or pericholecystic fluid.  Negative sonographic Queen's sign.   No intrahepatic biliary ductal dilatation. The common bile duct measures   4 mm, which is normal.     IMPRESSION:  No cholelithiasis or sonographic evidence of acute cholecystitis. No   biliary ductal dilatation.      MEDICATIONS:  MEDICATIONS  (STANDING):  chlorhexidine 4% Liquid 1 Application(s) Topical <User Schedule>  enoxaparin Injectable 40 milliGRAM(s) SubCutaneous daily  losartan 50 milliGRAM(s) Oral daily  sodium chloride 0.9%. 1000 milliLiter(s) (100 mL/Hr) IV Continuous <Continuous>        HOME MEDICATIONS:  Benicar 20 mg oral tablet ()      PHYSICAL EXAM:  GENERAL: well developed well nourished female resting in no acute distress  NECK: No Swelling  CHEST/LUNG: CTAB no wheezes, rales, or rhonchi  HEART: RRR, No murmurs, rubs, or gallops  ABDOMEN: soft non tender non distended negative Queen's sign, no stigmata of cirrhosis no hepatosplenomegaly    EXTREMITIES:  No clubbing, range of motion intact       1. Transaminitis from unclear cause  -GI consult   -Send hepatitis work-up  -Agree with toxicology screen, APAP level, ferratin, Autoimmune markers (antinuclear antibodies, anti-smooth muscle antibodies, anti-liver/kidney microsomal antibodies type 1, IgG)  -RUQ US  -Speak with out-pt providers  -IVFs  -Trend liver enzymes    2. Hyperkalemia, hemolyzed sample  -Repeat BMP    3. Hx of HTN and pre-DM  -C/w home meds    GI and DVT PPX DANNA PERERA  54y, Female  Allergy: tetracycline (Rash)    Hospital Day: 1d    Patient seen and examined this morning at bedside. pt states that since admission the symptoms which brought her to the ED have significantly improved. pts is primarily concerned about the persistent abnormal liver enzymes. she denies any new medications, alcohol use, or over the counter/supplement use. pt states she was being worked up by an outside GI (Dr. Glenn Pearson) however, does not have the latest blood work results. otherwise patient states she is tolerating diet and getting up and ambulating without symptoms of dizziness.      VITALS:  T(F): 97 (19 @ 08:07), Max: 97.7 (19 @ 01:00)  HR: 70 (19 @ 08:07)  BP: 125/70 (19 @ 08:07) (123/69 - 125/70)  RR: 18 (19 @ 08:07)  SpO2: 99% (19 @ 08:07)      PHYSICAL EXAM:  GENERAL: well developed well nourished female resting in no acute distress  NECK: No Swelling  CHEST/LUNG: CTAB no wheezes, rales, or rhonchi  HEART: RRR, No murmurs, rubs, or gallops  ABDOMEN: soft non tender non distended negative Queen's sign, no stigmata of cirrhosis no hepatosplenomegaly    EXTREMITIES:  No clubbing, range of motion intact     TESTS & MEASUREMENTS:  Weight (Kg):                             11.7   5.12  )-----------( 253      ( 23 Sep 2019 14:00 )             37.4     PT/INR - ( 23 Sep 2019 12:00 )   PT: 11.70 sec;   INR: 1.02 ratio         PTT - ( 23 Sep 2019 12:00 )  PTT:19.6 sec      135  |  103  |  12  ----------------------------<  93  4.3   |  22  |  0.8    Ca    8.8      24 Sep 2019 04:30  Mg     2.0         TPro  6.7  /  Alb  3.1<L>  /  TBili  2.6<H>  /  DBili  1.9<H>  /  AST  1211<H>  /  ALT  687<H>  /  AlkPhos  327<H>      LIVER FUNCTIONS - ( 24 Sep 2019 04:30 )  Alb: 3.1 g/dL / Pro: 6.7 g/dL / ALK PHOS: 327 U/L / ALT: 687 U/L / AST: 1211 U/L / GGT: x           CARDIAC MARKERS ( 23 Sep 2019 13:10 )  x     / <0.01 ng/mL / x     / x     / x            Urinalysis Basic - ( 23 Sep 2019 14:40 )    Color: Filomena / Appearance: Slightly Turbid / S.022 / pH: x  Gluc: x / Ketone: Small  / Bili: Small / Urobili: 6 mg/dL   Blood: x / Protein: 30 mg/dL / Nitrite: Negative   Leuk Esterase: Negative / RBC: 0 /HPF / WBC 2 /HPF   Sq Epi: x / Non Sq Epi: 9 /HPF / Bacteria: Few        RADIOLOGY & ADDITIONAL TESTS:    EXAM:  CT ABDOMEN AND PELVIS IC        PROCEDURE DATE:  2019    IMPRESSION:    No CT evidence of any acute inflammatory process within the abdomen or   pelvis.     Large bulky fibroid uterus, measuring 9.7 x 8.4 x 11.3 cm.      EXAM:  US ABDOMEN LIMITED        PROCEDURE DATE:  2019   FINDINGS:  LIVER:  Normal in contour and echogenicity measuring 12.2 cm in length.   No focal mass.    GALLBLADDER/BILIARY TREE:  No evidence of cholelithiasis. No wall   thickening or pericholecystic fluid.  Negative sonographic Queen's sign.   No intrahepatic biliary ductal dilatation. The common bile duct measures   4 mm, which is normal.     IMPRESSION:  No cholelithiasis or sonographic evidence of acute cholecystitis. No   biliary ductal dilatation.      MEDICATIONS:  MEDICATIONS  (STANDING):  chlorhexidine 4% Liquid 1 Application(s) Topical <User Schedule>  enoxaparin Injectable 40 milliGRAM(s) SubCutaneous daily  losartan 50 milliGRAM(s) Oral daily  sodium chloride 0.9%. 1000 milliLiter(s) (100 mL/Hr) IV Continuous <Continuous>        HOME MEDICATIONS:  Benicar 20 mg oral tablet ()

## 2019-09-24 NOTE — CONSULT NOTE ADULT - SUBJECTIVE AND OBJECTIVE BOX
Gastroenterology Consultation:    Patient is a 54y old  Female who presents with a chief complaint of Transaminitis (23 Sep 2019 22:53)      Admitted on: 09-23-19  HPI:  53 yo F w PMhx of HTN and pre DM presents to the ED for an episode of nausea, diaphoresis, and light-headedness. Patient reports an episode of sudden onset light-headedness, nausea, and diaphoresis today while ironing her clothes; she had another episode last week with the same complains. no orthostatic hypotension, not exacerbated by head movements. she also endorses fatigue decreased po intake with occasional nausea over the past few weeks. pt also reports dark urine but normal colored stool. pt endorses she newly diagnosed elevated LFTs as outpatient; with work up still in progress. pt denies recent travel, over counter medication use, IV drug use. pt is monogamous with 1 sexual partner with no protection. pt also reports 1 year ago she was seen by rheumatologist after she was referred by ophthalmologist for red eye and working diagnosis was mixed connective tissue disease. pt didn't follow up and currently takes no medications. pt had colonoscopy 8/2019 with 2 polyps and hemorrhoids and was told to follow up in 3 years. pt sees Dr. Glenn Pearson GI.      in the ED, pt is hemodynamically stable.  initial blood work with transaminitis.  CT abdomen with no acute pathology. (23 Sep 2019 22:53)      Prior records Reviewed (Y/N):  History obtained from person other than patient (Y/N):    Prior EGD:  Prior Colonoscopy:      PAST MEDICAL & SURGICAL HISTORY:  Pre-diabetes  Hypertension  No significant past surgical history      FAMILY HISTORY:  No pertinent family history in first degree relatives      Social History:  Tobacco:  Alcohol:  Drugs:    Home Medications:  Benicar 20 mg oral tablet: 1 tab(s) orally once a day (23 Sep 2019 23:50)    MEDICATIONS  (STANDING):  chlorhexidine 4% Liquid 1 Application(s) Topical <User Schedule>  enoxaparin Injectable 40 milliGRAM(s) SubCutaneous daily  losartan 50 milliGRAM(s) Oral daily    MEDICATIONS  (PRN):      Allergies  tetracycline (Rash)      Review of Systems:   Constitutional:  No Fever, No Chills  ENT/Mouth:  No Hearing Changes,  No Difficulty Swallowing  Eyes:  No Eye Pain, No Vision Changes  Cardiovascular:  No Chest Pain, No Palpitations  Respiratory:  No Cough, No Dyspnea  Gastrointestinal:  As described in HPI  Musculoskeletal:  No Joint Swelling, No Back Pain  Skin:  No Skin Lesions, No Jaundice  Neuro:  No Syncope, No Dizziness  Heme/Lymph:  No Bruising, No Bleeding.          Physical Examination:  T(C): 36.1 (09-24-19 @ 08:07), Max: 36.5 (09-24-19 @ 01:00)  HR: 70 (09-24-19 @ 08:07) (68 - 92)  BP: 125/70 (09-24-19 @ 08:07) (123/69 - 135/67)  RR: 18 (09-24-19 @ 08:07) (16 - 18)  SpO2: 99% (09-24-19 @ 08:07) (98% - 100%)        Constitutional: No acute distress.  Eyes:. Conjunctivae are clear, Sclera is non-icteric.  Ears Nose and Throat: The external ears are normal appearing,  Oral mucosa is pink and moist.  Respiratory:  No signs of respiratory distress. Lung sounds are clear bilaterally.  Cardiovascular:  S1 S2, Regular rate and rhythm.  GI: Abdomen is soft, symmetric, and non-tender without distention. There are no visible lesions or scars. Bowel sounds are present and normoactive in all four quadrants. No masses, hepatomegaly, or splenomegaly are noted.   Neuro: No Tremor, No involuntary movements  Skin: No rashes, No Jaundice.          Data: (reviewed by attending)                        11.7   5.12  )-----------( 253      ( 23 Sep 2019 14:00 )             37.4     Hgb Trend:  11.7  09-23-19 @ 14:00      09-24    135  |  103  |  12  ----------------------------<  93  4.3   |  22  |  0.8    Ca    8.8      24 Sep 2019 04:30  Mg     2.0     09-24    TPro  6.7  /  Alb  3.1<L>  /  TBili  2.6<H>  /  DBili  1.9<H>  /  AST  1211<H>  /  ALT  687<H>  /  AlkPhos  327<H>  09-24    Liver panel trend:  TBili 2.6   /   AST 1211   /      /   AlkP 327   /   Tptn 6.7   /   Alb 3.1    /   DBili 1.9      09-24  TBili 3.2   /   AST 1371   /      /   AlkP 335   /   Tptn 7.6   /   Alb 3.2    /   DBili --      09-23      PT/INR - ( 23 Sep 2019 12:00 )   PT: 11.70 sec;   INR: 1.02 ratio         PTT - ( 23 Sep 2019 12:00 )  PTT:19.6 sec        Radiology:(reviewed by attending)  CT Abdomen and Pelvis w/ IV Cont:   EXAM:  CT ABDOMEN AND PELVIS IC            PROCEDURE DATE:  09/23/2019            INTERPRETATION:  CLINICAL HISTORY: Transaminitis.    TECHNIQUE: Contiguous axial CT images were obtained from the lower chest   to the pubic symphysis following administration of Optiray intravenous   contrast. Oral contrast was not administered. Reformatted images in the   coronal and sagittal planes were acquired.    COMPARISON: None.      FINDINGS:    LOWER CHEST: Mild medial right lower lobe subsegmental atelectasis.    HEPATOBILIARY: Unremarkable.    SPLEEN: Unremarkable.    PANCREAS: Unremarkable.    ADRENAL GLANDS: Unremarkable.    KIDNEYS: No hydronephrosis bilaterally. Renal contrast enhancement is   symmetric.    ABDOMINOPELVIC NODES: No lymphadenopathy.    PELVIC ORGANS: Large bulky fibroid uterus, measuring 9.7 x 8.4 x 11.3 cm.    PERITONEUM/MESENTERY/BOWEL: No pneumoperitoneum. No abdominopelvic   ascites. No evidence of bowel obstruction.    BONES/SOFT TISSUES: No acute osseous abnormality.      IMPRESSION:    No CT evidence of any acute inflammatory process within the abdomen or   pelvis.     Large bulky fibroid uterus, measuring 9.7 x 8.4 x 11.3 cm.              BUTCH ANIK M.D., RESIDENT RADIOLOGIST  This document has been electronically signed.  ADRIEL BROOKS M.D., ATTENDING RADIOLOGIST  This document has been electronically signed. Sep 23 2019  5:46PM             (09-23-19 @ 17:07)    US Abdomen Limited:   EXAM:  US ABDOMEN LIMITED            PROCEDURE DATE:  09/24/2019            INTERPRETATION:  CLINICAL HISTORY: Transaminitis. Nausea..    COMPARISON: Correlation made with abdominal CT dated 9/23/2019.    PROCEDURE: Ultrasound of the right upper quadrant was performed.    FINDINGS:    LIVER:  Normal in contour and echogenicity measuring 12.2 cm in length.   No focal mass.    GALLBLADDER/BILIARY TREE:  No evidence of cholelithiasis. No wall   thickening or pericholecystic fluid.  Negative sonographic Queen's sign.   No intrahepatic biliary ductal dilatation. The common bile duct measures   4 mm, which is normal.     PANCREAS:  Visualized head and body are unremarkable. Remainder obscured   by overlying bowel gas.    KIDNEY:  Right kidney measures 9.5 cm in length. No hydronephrosis,   calculi or solid mass.    AORTA/IVC:  Visualized proximal portions unremarkable.    ASCITES:  None.    IMPRESSION:    No cholelithiasis or sonographic evidence of acute cholecystitis. No   biliary ductal dilatation.                RODRÍGUEZ TURNER M.D., RESIDENT RADIOLOGIST  This document has been electronically signed.  MINDY MONTANO M.D., ATTENDING RADIOLOGIST  This document has been electronically signed. Sep 24 2019  9:07AM             (09-24-19 @ 06:49) Gastroenterology Consultation:    Patient is a 54y old  Female who presents with a chief complaint of Transaminitis (23 Sep 2019 22:53)      Admitted on: 09-23-19  HPI:  55 yo F w PMhx of HTN and pre DM presents to the ED for an episode of nausea, diaphoresis, and light-headedness. Patient reports an episode of sudden onset light-headedness, nausea, and diaphoresis today while ironing her clothes; she had another episode last week with the same complains. no orthostatic hypotension, not exacerbated by head movements. she also endorses fatigue decreased po intake with occasional nausea over the past few weeks. pt also reports dark urine but normal colored stool. pt endorses she newly diagnosed elevated LFTs as outpatient (9/20); with work up still in progress. pt denies recent travel, over counter medication use, IV drug use. pt is monogamous with 1 sexual partner with no protection. pt also reports 1 year ago she was seen by rheumatologist after she was referred by ophthalmologist for red eye and working diagnosis was mixed connective tissue disease. pt didn't follow up and currently takes no medications. pt had colonoscopy 8/2019 with 2 polyps and hemorrhoids and was told to follow up in 3 years. pt sees Dr. Glenn Pearson GI.      in the ED, pt is hemodynamically stable.  initial blood work with transaminitis.  CT abdomen with no acute pathology. (23 Sep 2019 22:53)      Prior records Reviewed (Y/N):Y  History obtained from person other than patient (Y/N): Y        PAST MEDICAL & SURGICAL HISTORY:  Pre-diabetes  Hypertension  No significant past surgical history      FAMILY HISTORY:  No pertinent family history in first degree relatives      Social History:  No IVDU    Home Medications:  Benicar 20 mg oral tablet: 1 tab(s) orally once a day (23 Sep 2019 23:50)    MEDICATIONS  (STANDING):  chlorhexidine 4% Liquid 1 Application(s) Topical <User Schedule>  enoxaparin Injectable 40 milliGRAM(s) SubCutaneous daily  losartan 50 milliGRAM(s) Oral daily           Allergies  tetracycline (Rash)      Review of Systems:   Constitutional:  No Fever, No Chills  ENT/Mouth:  No Hearing Changes,  No Difficulty Swallowing  Eyes:  No Eye Pain, No Vision Changes  Cardiovascular:  No Chest Pain, No Palpitations  Respiratory:  No Cough, No Dyspnea  Gastrointestinal:  As described in HPI  Musculoskeletal:  No Joint Swelling, No Back Pain  Skin:  No Skin Lesions, No Jaundice  Neuro:  No Syncope, No Dizziness  Heme/Lymph:  No Bruising, No Bleeding.          Physical Examination:  T(C): 36.1 (09-24-19 @ 08:07), Max: 36.5 (09-24-19 @ 01:00)  HR: 70 (09-24-19 @ 08:07) (68 - 92)  BP: 125/70 (09-24-19 @ 08:07) (123/69 - 135/67)  RR: 18 (09-24-19 @ 08:07) (16 - 18)  SpO2: 99% (09-24-19 @ 08:07) (98% - 100%)        Constitutional: No acute distress.  Eyes:. Conjunctivae are clear, Sclera is non-icteric.  Ears Nose and Throat: The external ears are normal appearing,  Oral mucosa is pink and moist.  Respiratory:  No signs of respiratory distress. Lung sounds are clear bilaterally.  Cardiovascular:  S1 S2, Regular rate and rhythm.  GI: Abdomen is soft, symmetric, and non-tender without distention. There are no visible lesions or scars. Bowel sounds are present and normoactive in all four quadrants. No masses, hepatomegaly, or splenomegaly are noted.   Neuro: No Tremor, No involuntary movements  Skin: No rashes, No Jaundice.          Data: (reviewed by attending)                        11.7   5.12  )-----------( 253      ( 23 Sep 2019 14:00 )             37.4     Hgb Trend:  11.7  09-23-19 @ 14:00      09-24    135  |  103  |  12  ----------------------------<  93  4.3   |  22  |  0.8    Ca    8.8      24 Sep 2019 04:30  Mg     2.0     09-24    TPro  6.7  /  Alb  3.1<L>  /  TBili  2.6<H>  /  DBili  1.9<H>  /  AST  1211<H>  /  ALT  687<H>  /  AlkPhos  327<H>  09-24    Liver panel trend:  TBili 2.6   /   AST 1211   /      /   AlkP 327   /   Tptn 6.7   /   Alb 3.1    /   DBili 1.9      09-24  TBili 3.2   /   AST 1371   /      /   AlkP 335   /   Tptn 7.6   /   Alb 3.2    /   DBili --      09-23      PT/INR - ( 23 Sep 2019 12:00 )   PT: 11.70 sec;   INR: 1.02 ratio         PTT - ( 23 Sep 2019 12:00 )  PTT:19.6 sec        Radiology:(reviewed by attending)  CT Abdomen and Pelvis w/ IV Cont:   EXAM:  CT ABDOMEN AND PELVIS IC            PROCEDURE DATE:  09/23/2019            INTERPRETATION:  CLINICAL HISTORY: Transaminitis.    TECHNIQUE: Contiguous axial CT images were obtained from the lower chest   to the pubic symphysis following administration of Optiray intravenous   contrast. Oral contrast was not administered. Reformatted images in the   coronal and sagittal planes were acquired.    COMPARISON: None.      FINDINGS:    LOWER CHEST: Mild medial right lower lobe subsegmental atelectasis.    HEPATOBILIARY: Unremarkable.    SPLEEN: Unremarkable.    PANCREAS: Unremarkable.    ADRENAL GLANDS: Unremarkable.    KIDNEYS: No hydronephrosis bilaterally. Renal contrast enhancement is   symmetric.    ABDOMINOPELVIC NODES: No lymphadenopathy.    PELVIC ORGANS: Large bulky fibroid uterus, measuring 9.7 x 8.4 x 11.3 cm.    PERITONEUM/MESENTERY/BOWEL: No pneumoperitoneum. No abdominopelvic   ascites. No evidence of bowel obstruction.    BONES/SOFT TISSUES: No acute osseous abnormality.      IMPRESSION:    No CT evidence of any acute inflammatory process within the abdomen or   pelvis.     Large bulky fibroid uterus, measuring 9.7 x 8.4 x 11.3 cm.              BUTCH NAIK M.D., RESIDENT RADIOLOGIST  This document has been electronically signed.  ADRIEL BROOKS M.D., ATTENDING RADIOLOGIST  This document has been electronically signed. Sep 23 2019  5:46PM             (09-23-19 @ 17:07)    US Abdomen Limited:   EXAM:  US ABDOMEN LIMITED            PROCEDURE DATE:  09/24/2019            INTERPRETATION:  CLINICAL HISTORY: Transaminitis. Nausea..    COMPARISON: Correlation made with abdominal CT dated 9/23/2019.    PROCEDURE: Ultrasound of the right upper quadrant was performed.    FINDINGS:    LIVER:  Normal in contour and echogenicity measuring 12.2 cm in length.   No focal mass.    GALLBLADDER/BILIARY TREE:  No evidence of cholelithiasis. No wall   thickening or pericholecystic fluid.  Negative sonographic Queen's sign.   No intrahepatic biliary ductal dilatation. The common bile duct measures   4 mm, which is normal.     PANCREAS:  Visualized head and body are unremarkable. Remainder obscured   by overlying bowel gas.    KIDNEY:  Right kidney measures 9.5 cm in length. No hydronephrosis,   calculi or solid mass.    AORTA/IVC:  Visualized proximal portions unremarkable.    ASCITES:  None.    IMPRESSION:    No cholelithiasis or sonographic evidence of acute cholecystitis. No   biliary ductal dilatation.                RODRÍGUEZ TURNER M.D., RESIDENT RADIOLOGIST  This document has been electronically signed.  MINDY MONTANO M.D., ATTENDING RADIOLOGIST  This document has been electronically signed. Sep 24 2019  9:07AM             (09-24-19 @ 06:49)

## 2019-09-24 NOTE — CONSULT NOTE ADULT - ASSESSMENT
Patient being evaluated for new onset mixed transaminitis in the absence of drug or alcohol use, no family history of liver disease.    LFTs downtrending  INR within normal range  No encephalopathy/asterixis  No inciting medications  US did not show cholelithiasis or fatty liver or CBD dilation  CT scan showed no acute abdominopelvic process  Avoid APAP  F/u blood work for CLD    Colonic polyps:  Follow up with outpatient GI for 3 year colonoscopy    Hemorrhoids:  continue with stool softeners and sitz bath    HTN pre-DM : per Primary team Patient being evaluated for new onset mixed transaminitis in the absence of drug or alcohol use, no family history of liver disease.    # New onset mixed transaminitis:  No acute liver failure   DDX inflammatory vs infectious, has hx of MCTD  CHULA positive  LFTs downtrending but not drastically  INR within normal range  No encephalopathy/asterixis  No inciting medications  US did not show cholelithiasis or fatty liver or CBD dilation  CT scan showed no acute abdominopelvic process  Avoid APAP/ hepatotoxic drugs  Will obtain liver biopsy    Colonic polyps:  Follow up with outpatient GI for 3 year colonoscopy    Hemorrhoids:  continue with stool softeners and sitz bath    HTN pre-DM : per Primary team Patient being evaluated for new onset mixed transaminitis in the absence of drug or alcohol use, no family history of liver disease.    # New onset mixed transaminitis:   No evidenceacute liver failure   DDX inflammatory vs infectious. 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 Elevated alkaline phosphatase indicates a possible overlap syndrome with PBC  LFTs slightly downtrending  INR within normal range  No encephalopathy/asterixis  No obvious inciting medications (patient was on losartan)  US did not show cholelithiasis or fatty liver or CBD dilation  CT scan showed no acute abdominopelvic process  Avoid APAP/ hepatotoxic drugs  Will obtain liver biopsy to establish the diagnosis as soon as possible      Colonic polyps:  Follow up with outpatient GI for 3 year colonoscopy    Hemorrhoids:  continue with stool softeners and sitz bath Patient being evaluated for new onset mixed transaminitis in the absence of drug or alcohol use, no family history of liver disease.    # New onset mixed transaminitis:   No evidenceacute liver failure   DDX inflammatory vs infectious. 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 Elevated alkaline phosphatase indicates a possible overlap syndrome with PBC  LFTs slightly downtrending  INR within normal range  No encephalopathy/asterixis  No obvious inciting medications (patient was on losartan)  US did not show cholelithiasis or fatty liver or CBD dilation  CT scan showed no acute abdominopelvic process  Avoid APAP/ hepatotoxic drugs  Will obtain liver biopsy to establish the diagnosis as soon as possible  # already obtained: acetohexamide level, CHULA, Acute hepatitis panel, Ferritin, Ig G subsets,  Iron and TIBC, antimicrosomal, ASMA  # get anti liver-kidney antibodies, EBV, CMV, % sat of iron,  antimitochondrial ab, ceruloplasmin, AFP      Colonic polyps:  Follow up with outpatient GI for 3 year colonoscopy    Hemorrhoids:  continue with stool softeners and sitz bath Patient being evaluated for new onset mixed transaminitis in the absence of drug or alcohol use, no family history of liver disease.    # New onset mixed transaminitis:   No evidenceacute liver failure   DDX inflammatory vs infectious. 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 Elevated alkaline phosphatase indicates a possible overlap syndrome with PBC  LFTs slightly downtrending  INR within normal range  No encephalopathy/asterixis  No obvious inciting medications (patient was on losartan)  US did not show cholelithiasis or fatty liver or CBD dilation  CT scan showed no acute abdominopelvic process  Avoid APAP/ hepatotoxic drugs  Will obtain liver biopsy to establish the diagnosis as soon as possible  # already obtained: acetohexamide level, CHULA, Acute hepatitis panel, Ferritin, Ig G subsets,  Iron and TIBC, antimicrosomal, ASMA  # get anti liver-kidney antibodies, EBV, CMV, HSV, % sat of iron,  antimitochondrial ab, ceruloplasmin,        Colonic polyps:  Follow up with outpatient GI for 3 year colonoscopy    Hemorrhoids:  continue with stool softeners and sitz bath

## 2019-09-25 ENCOUNTER — RESULT REVIEW (OUTPATIENT)
Age: 54
End: 2019-09-25

## 2019-09-25 LAB
ALBUMIN SERPL ELPH-MCNC: 2.6 G/DL — LOW (ref 3.5–5.2)
ALP SERPL-CCNC: 308 U/L — HIGH (ref 30–115)
ALT FLD-CCNC: 611 U/L — HIGH (ref 0–41)
ANION GAP SERPL CALC-SCNC: 8 MMOL/L — SIGNIFICANT CHANGE UP (ref 7–14)
AST SERPL-CCNC: 1076 U/L — HIGH (ref 0–41)
BILIRUB SERPL-MCNC: 2.1 MG/DL — HIGH (ref 0.2–1.2)
BUN SERPL-MCNC: 10 MG/DL — SIGNIFICANT CHANGE UP (ref 10–20)
CALCIUM SERPL-MCNC: 8.9 MG/DL — SIGNIFICANT CHANGE UP (ref 8.5–10.1)
CHLORIDE SERPL-SCNC: 110 MMOL/L — SIGNIFICANT CHANGE UP (ref 98–110)
CO2 SERPL-SCNC: 24 MMOL/L — SIGNIFICANT CHANGE UP (ref 17–32)
CREAT SERPL-MCNC: 0.9 MG/DL — SIGNIFICANT CHANGE UP (ref 0.7–1.5)
CULTURE RESULTS: SIGNIFICANT CHANGE UP
FOLATE SERPL-MCNC: >20 NG/ML — SIGNIFICANT CHANGE UP
GLUCOSE SERPL-MCNC: 89 MG/DL — SIGNIFICANT CHANGE UP (ref 70–99)
HCT VFR BLD CALC: 32.9 % — LOW (ref 37–47)
HCV AB S/CO SERPL IA: 0.12 S/CO — SIGNIFICANT CHANGE UP (ref 0–0.99)
HCV AB SERPL-IMP: SIGNIFICANT CHANGE UP
HGB BLD-MCNC: 10.1 G/DL — LOW (ref 12–16)
IRON SATN MFR SERPL: 16 % — SIGNIFICANT CHANGE UP (ref 15–50)
IRON SATN MFR SERPL: 55 UG/DL — SIGNIFICANT CHANGE UP (ref 35–150)
MCHC RBC-ENTMCNC: 21.3 PG — LOW (ref 27–31)
MCHC RBC-ENTMCNC: 30.7 G/DL — LOW (ref 32–37)
MCV RBC AUTO: 69.4 FL — LOW (ref 81–99)
NRBC # BLD: 0 /100 WBCS — SIGNIFICANT CHANGE UP (ref 0–0)
PLATELET # BLD AUTO: 242 K/UL — SIGNIFICANT CHANGE UP (ref 130–400)
POTASSIUM SERPL-MCNC: 4.9 MMOL/L — SIGNIFICANT CHANGE UP (ref 3.5–5)
POTASSIUM SERPL-SCNC: 4.9 MMOL/L — SIGNIFICANT CHANGE UP (ref 3.5–5)
PROT SERPL-MCNC: 6 G/DL — SIGNIFICANT CHANGE UP (ref 6–8)
RBC # BLD: 4.74 M/UL — SIGNIFICANT CHANGE UP (ref 4.2–5.4)
RBC # FLD: 22.1 % — HIGH (ref 11.5–14.5)
SMOOTH MUSCLE AB SER-ACNC: SIGNIFICANT CHANGE UP
SODIUM SERPL-SCNC: 142 MMOL/L — SIGNIFICANT CHANGE UP (ref 135–146)
SPECIMEN SOURCE: SIGNIFICANT CHANGE UP
TIBC SERPL-MCNC: 337 UG/DL — SIGNIFICANT CHANGE UP (ref 220–430)
UIBC SERPL-MCNC: 282 UG/DL — SIGNIFICANT CHANGE UP (ref 110–370)
VIT B12 SERPL-MCNC: >2000 PG/ML — HIGH (ref 232–1245)
WBC # BLD: 4.44 K/UL — LOW (ref 4.8–10.8)
WBC # FLD AUTO: 4.44 K/UL — LOW (ref 4.8–10.8)

## 2019-09-25 PROCEDURE — 99152 MOD SED SAME PHYS/QHP 5/>YRS: CPT

## 2019-09-25 PROCEDURE — 88313 SPECIAL STAINS GROUP 2: CPT | Mod: 26

## 2019-09-25 PROCEDURE — 99232 SBSQ HOSP IP/OBS MODERATE 35: CPT

## 2019-09-25 PROCEDURE — 76942 ECHO GUIDE FOR BIOPSY: CPT | Mod: 26

## 2019-09-25 PROCEDURE — 88307 TISSUE EXAM BY PATHOLOGIST: CPT | Mod: 26

## 2019-09-25 PROCEDURE — 99233 SBSQ HOSP IP/OBS HIGH 50: CPT

## 2019-09-25 PROCEDURE — 47000 NEEDLE BIOPSY OF LIVER PERQ: CPT

## 2019-09-25 RX ADMIN — LOSARTAN POTASSIUM 50 MILLIGRAM(S): 100 TABLET, FILM COATED ORAL at 06:11

## 2019-09-25 RX ADMIN — PANTOPRAZOLE SODIUM 40 MILLIGRAM(S): 20 TABLET, DELAYED RELEASE ORAL at 06:11

## 2019-09-25 NOTE — PROGRESS NOTE ADULT - ASSESSMENT
# New onset mixed transaminitis:   No evidence of acute liver failure   DDX inflammatory vs infectious. 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 Elevated alkaline phosphatase indicates a possible overlap syndrome with PBC  LFTs slightly downtrending  INR within normal range  No encephalopathy/asterixis  No obvious inciting medications (patient was on losartan)  US did not show cholelithiasis or fatty liver or CBD dilation  CT scan showed no acute abdominopelvic process  Avoid APAP/ hepatotoxic drugs    To point,  HepA/B/C serologies are negative    # already obtained: acetohexamide level, CHULA, Acute hepatitis panel, Ferritin, Ig G subsets,  Iron and TIBC, antimicrosomal, ASMA    # please get anti liver-kidney antibodies, EBV, CMV, HSV, % sat of iron,  antimitochondrial ab, ceruloplasmin       Colonic polyps:  Follow up with outpatient GI for 3 year colonoscopy    Hemorrhoids:  continue with stool softeners and sitz bath # New onset mixed transaminitis:   No evidence of acute liver failure   DDX inflammatory vs infectious. 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 Elevated alkaline phosphatase indicates a possible overlap syndrome with PBC  LFTs slightly downtrending  INR within normal range  No encephalopathy/asterixis  No obvious inciting medications (patient was on losartan)  US did not show cholelithiasis or fatty liver or CBD dilation  CT scan showed no acute abdominopelvic process  Avoid APAP/ hepatotoxic drugs    To point,  HepA/B/C serologies are negative    - already obtained: acetohexamide level, HCULA, Acute hepatitis panel, Ferritin, Ig G subsets,  Iron and TIBC, antimicrosomal, ASMA    - please get anti liver-kidney antibodies, EBV, CMV, HSV, % sat of iron,  antimitochondrial ab, ceruloplasmin     If LFTs are downtrending, d/c in AM and obtain f/u LFTs in 1 week and hepatology clinic follow up in 2 weeks to get liver biopsy results    Colonic polyps:  Follow up with outpatient GI for 3 year colonoscopy    Hemorrhoids:  continue with stool softeners and sitz bath # New onset mixed elevation of liver enzymes:   No evidence of acute liver failure   DDX inflammatory vs infectious. 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 However, ASMA and AMA raissa negative. Elevated alkaline phosphatase could indicate possible overlap syndrome with PBC  LFTs slightly downtrending  INR within normal range  No encephalopathy/asterixis  No obvious inciting medications (patient was on losartan)  US did not show cholelithiasis or fatty liver or CBD dilation  CT scan showed no acute abdominopelvic process  Avoid APAP/ hepatotoxic drugs    To point,  HepA/B/C serologies are negative    - already obtained: acetohexamide level, CHULA, Acute hepatitis panel, Ferritin, Ig G subsets,  Iron and TIBC, antimicrosomal, ASMA    - please get anti liver-kidney antibodies, EBV, CMV, HSV, % sat of iron,  antimitochondrial ab, ceruloplasmin     If LFTs are downtrending, d/c in AM and obtain f/u LFTs in 1 week and hepatology clinic follow up in 2 weeks to get liver biopsy results    Colonic polyps:  Follow up with outpatient GI for 3 year colonoscopy

## 2019-09-25 NOTE — PROGRESS NOTE ADULT - SUBJECTIVE AND OBJECTIVE BOX
Interventional Radiology Brief- Operative Note    Procedure: US guided liver biopsy    Pre-Op Diagnosis: Increased LFTs    Post-Op Diagnosis: same    Attending:   Yanira    Resident:   none    Anesthesia (type):  [ ] General Anesthesia  [x ] Sedation  [ ] Spinal Anesthesia  [ x] Local/Regional    Contrast:   none  Estimated Blood Loss:  < 5ml    Condition:   [ ] Critical  [ ] Serious  [ ] Fair   [x ] Good    Findings/Follow up Plan of Care:  - Gelfoam injected into biopsy bed  -F/U results in five days  Specimens Removed:  2- 18G cores  Implants:  none  Complications:  none  Condition/Disposition:  DC home    Please call Interventional Radiology [phone] with any questions, concerns, or issues.

## 2019-09-25 NOTE — PROGRESS NOTE ADULT - SUBJECTIVE AND OBJECTIVE BOX
Gastroenterology progress note:     Patient is a 54y old  Female who presents with a chief complaint of Transaminitis (25 Sep 2019 09:00)       Admitted on: 09-23-19    We are following the patient for: transaminitis      Interval History: getting IR targeted biopsy of the liver    Patient's medical problems are improving/stable/not improving/unstable/   Prior records reviewed (Y/N):  History obtained from someone other than patient (Y/N):      PAST MEDICAL & SURGICAL HISTORY:  Pre-diabetes  Hypertension  No significant past surgical history      MEDICATIONS  (STANDING):  chlorhexidine 4% Liquid 1 Application(s) Topical <User Schedule>  enoxaparin Injectable 40 milliGRAM(s) SubCutaneous daily  losartan 50 milliGRAM(s) Oral daily  pantoprazole    Tablet 40 milliGRAM(s) Oral before breakfast  sodium chloride 0.9%. 1000 milliLiter(s) (100 mL/Hr) IV Continuous <Continuous>    MEDICATIONS  (PRN):      Allergies  tetracycline (Rash)      Review of Systems:   Cardiovascular:  No Chest Pain, No Palpitations  Respiratory:  No Cough, No Dyspnea  Gastrointestinal:  As described in HPI    Physical Examination:  T(C): 36.2 (09-24-19 @ 23:54), Max: 36.2 (09-24-19 @ 23:54)  HR: 66 (09-24-19 @ 23:54) (66 - 96)  BP: 148/80 (09-24-19 @ 23:54) (131/80 - 148/80)  RR: 18 (09-24-19 @ 23:54) (18 - 18)  SpO2: 100% (09-24-19 @ 23:54) (100% - 100%)      09-24-19 @ 07:01  -  09-25-19 @ 07:00  --------------------------------------------------------  IN: 1100 mL / OUT: 0 mL / NET: 1100 mL      Constitutional: No acute distress.  Respiratory:  No signs of respiratory distress. Lung sounds are clear bilaterally.  Cardiovascular:  S1 S2, Regular rate and rhythm.  Abdominal: Abdomen is soft, symmetric, and non-tender without distention. There are no visible lesions or scars. Bowel sounds are present and normoactive in all four quadrants. No masses, hepatomegaly, or splenomegaly are noted.   Skin: No rashes, No Jaundice.        Data: (reviewed by attending)                        10.1   4.44  )-----------( 242      ( 25 Sep 2019 06:01 )             32.9     Hgb trend:  10.1  09-25-19 @ 06:01  11.7  09-23-19 @ 14:00        09-25    142  |  110  |  10  ----------------------------<  89  4.9   |  24  |  0.9    Ca    8.9      25 Sep 2019 06:01  Mg     2.0     09-24    TPro  6.0  /  Alb  2.6<L>  /  TBili  2.1<H>  /  DBili  x   /  AST  1076<H>  /  ALT  611<H>  /  AlkPhos  308<H>  09-25    Liver panel trend:  TBili 2.1   /   AST 1076   /      /   AlkP 308   /   Tptn 6.0   /   Alb 2.6    /   DBili --      09-25  TBili 2.6   /   AST 1211   /      /   AlkP 327   /   Tptn 6.7   /   Alb 3.1    /   DBili 1.9      09-24  TBili 3.2   /   AST 1371   /      /   AlkP 335   /   Tptn 7.6   /   Alb 3.2    /   DBili --      09-23      PT/INR - ( 23 Sep 2019 12:00 )   PT: 11.70 sec;   INR: 1.02 ratio         PTT - ( 23 Sep 2019 12:00 )  PTT:19.6 sec    Culture - Urine (collected 23 Sep 2019 14:40)  Source: .Urine Clean Catch (Midstream)  Final Report (25 Sep 2019 06:39):    >=3 organisms. Probable collection contamination.         Radiology: (reviewed by attending)    US Abdomen Limited:   EXAM:  US ABDOMEN LIMITED            PROCEDURE DATE:  09/24/2019            INTERPRETATION:  CLINICAL HISTORY: Transaminitis. Nausea..    COMPARISON: Correlation made with abdominal CT dated 9/23/2019.    PROCEDURE: Ultrasound of the right upper quadrant was performed.    FINDINGS:    LIVER:  Normal in contour and echogenicity measuring 12.2 cm in length.   No focal mass.    GALLBLADDER/BILIARY TREE:  No evidence of cholelithiasis. No wall   thickening or pericholecystic fluid.  Negative sonographic Queen's sign.   No intrahepatic biliary ductal dilatation. The common bile duct measures   4 mm, which is normal.     PANCREAS:  Visualized head and body are unremarkable. Remainder obscured   by overlying bowel gas.    KIDNEY:  Right kidney measures 9.5 cm in length. No hydronephrosis,   calculi or solid mass.    AORTA/IVC:  Visualized proximal portions unremarkable.    ASCITES:  None.    IMPRESSION:    No cholelithiasis or sonographic evidence of acute cholecystitis. No   biliary ductal dilatation.                RODRÍGUEZ TURNER M.D., RESIDENT RADIOLOGIST  This document has been electronically signed.  MINDY MONTANO M.D., ATTENDING RADIOLOGIST  This document has been electronically signed. Sep 24 2019  9:07AM             (09-24-19 @ 06:49) Gastroenterology progress note:     Patient is a 54y old  Female who presents with a chief complaint of Transaminitis (25 Sep 2019 09:00)       Admitted on: 09-23-19    We are following the patient for: transaminitis      Interval History: IR targeted biopsy of the liver today. no symptoms    Patient's medical problems are slightly improving    Prior records reviewed (Y/N): Y  History obtained from someone other than patient (Y/N): N      PAST MEDICAL & SURGICAL HISTORY:  Pre-diabetes  Hypertension  No significant past surgical history      MEDICATIONS  (STANDING):  chlorhexidine 4% Liquid 1 Application(s) Topical <User Schedule>  enoxaparin Injectable 40 milliGRAM(s) SubCutaneous daily  losartan 50 milliGRAM(s) Oral daily  pantoprazole    Tablet 40 milliGRAM(s) Oral before breakfast  sodium chloride 0.9%. 1000 milliLiter(s) (100 mL/Hr) IV Continuous <Continuous>    Allergies  tetracycline (Rash)      Review of Systems:   Cardiovascular:  No Chest Pain, No Palpitations  Respiratory:  No Cough, No Dyspnea  Gastrointestinal:  As described in HPI    Physical Examination:  T(C): 36.2 (09-24-19 @ 23:54), Max: 36.2 (09-24-19 @ 23:54)  HR: 66 (09-24-19 @ 23:54) (66 - 96)  BP: 148/80 (09-24-19 @ 23:54) (131/80 - 148/80)  RR: 18 (09-24-19 @ 23:54) (18 - 18)  SpO2: 100% (09-24-19 @ 23:54) (100% - 100%)      09-24-19 @ 07:01  -  09-25-19 @ 07:00  --------------------------------------------------------  IN: 1100 mL / OUT: 0 mL / NET: 1100 mL      Constitutional: No acute distress.  Respiratory:  No signs of respiratory distress. Lung sounds are clear bilaterally.  Cardiovascular:  S1 S2, Regular rate and rhythm.  Abdominal: Abdomen is soft, symmetric, and non-tender without distention. There are no visible lesions or scars. Bowel sounds are present and normoactive in all four quadrants. No masses, hepatomegaly, or splenomegaly are noted.   Skin: No rashes, No Jaundice.        Data: (reviewed by attending)                        10.1   4.44  )-----------( 242      ( 25 Sep 2019 06:01 )             32.9     Hgb trend:  10.1  09-25-19 @ 06:01  11.7  09-23-19 @ 14:00        09-25    142  |  110  |  10  ----------------------------<  89  4.9   |  24  |  0.9    Ca    8.9      25 Sep 2019 06:01  Mg     2.0     09-24    TPro  6.0  /  Alb  2.6<L>  /  TBili  2.1<H>  /  DBili  x   /  AST  1076<H>  /  ALT  611<H>  /  AlkPhos  308<H>  09-25    Liver panel trend:  TBili 2.1   /   AST 1076   /      /   AlkP 308   /   Tptn 6.0   /   Alb 2.6    /   DBili --      09-25  TBili 2.6   /   AST 1211   /      /   AlkP 327   /   Tptn 6.7   /   Alb 3.1    /   DBili 1.9      09-24  TBili 3.2   /   AST 1371   /      /   AlkP 335   /   Tptn 7.6   /   Alb 3.2    /   DBili --      09-23      PT/INR - ( 23 Sep 2019 12:00 )   PT: 11.70 sec;   INR: 1.02 ratio         PTT - ( 23 Sep 2019 12:00 )  PTT:19.6 sec    Culture - Urine (collected 23 Sep 2019 14:40)  Source: .Urine Clean Catch (Midstream)  Final Report (25 Sep 2019 06:39):    >=3 organisms. Probable collection contamination.         Radiology: (reviewed by attending)    US Abdomen Limited:   EXAM:  US ABDOMEN LIMITED            PROCEDURE DATE:  09/24/2019            INTERPRETATION:  CLINICAL HISTORY: Transaminitis. Nausea..    COMPARISON: Correlation made with abdominal CT dated 9/23/2019.    PROCEDURE: Ultrasound of the right upper quadrant was performed.    FINDINGS:    LIVER:  Normal in contour and echogenicity measuring 12.2 cm in length.   No focal mass.    GALLBLADDER/BILIARY TREE:  No evidence of cholelithiasis. No wall   thickening or pericholecystic fluid.  Negative sonographic Queen's sign.   No intrahepatic biliary ductal dilatation. The common bile duct measures   4 mm, which is normal.     PANCREAS:  Visualized head and body are unremarkable. Remainder obscured   by overlying bowel gas.    KIDNEY:  Right kidney measures 9.5 cm in length. No hydronephrosis,   calculi or solid mass.    AORTA/IVC:  Visualized proximal portions unremarkable.    ASCITES:  None.    IMPRESSION:    No cholelithiasis or sonographic evidence of acute cholecystitis. No   biliary ductal dilatation.                RODRÍGUEZ TURNER M.D., RESIDENT RADIOLOGIST  This document has been electronically signed.  MINDY MONTANO M.D., ATTENDING RADIOLOGIST  This document has been electronically signed. Sep 24 2019  9:07AM             (09-24-19 @ 06:49)

## 2019-09-25 NOTE — PROGRESS NOTE ADULT - SUBJECTIVE AND OBJECTIVE BOX
Vascular & Interventional Radiology Pre-Procedure Note    Procedure Name: non-target liver biopsy with conscious sedation    HPI: HPI:  53 yo F w PMhx of HTN and pre DM presents to the ED for an episode of nausea, diaphoresis, and light-headedness. Patient reports an episode of sudden onset light-headedness, nausea, and diaphoresis today while ironing her clothes; she had another episode last week with the same complains. no orthostatic hypotension, not exacerbated by head movements. she also endorses fatigue decreased po intake with occasional nausea over the past few weeks. pt also reports dark urine but normal colored stool. pt endorses she newly diagnosed elevated LFTs as outpatient; with work up still in progress. pt denies recent travel, over counter medication use, IV drug use. pt is monogamous with 1 sexual partner with no protection. pt also reports 1 year ago she was seen by rheumatologist after she was referred by ophthalmologist for red eye and working diagnosis was mixed connective tissue disease. pt didn't follow up and currently takes no medications. pt had colonoscopy 8/2019 with 2 polyps and hemorrhoids and was told to follow up in 3 years. pt sees Dr. Glenn Pearson GI.      in the ED, pt is hemodynamically stable.  initial blood work with transaminitis.  CT abdomen with no acute pathology. (23 Sep 2019 22:53)      Allergies: tetracycline (Rash)    Medications (Abx/Cardiac/Anticoagulation/Blood Products)    losartan: 50 milliGRAM(s) Oral (09-25 @ 06:11)    Data:    T(C): 36.2  HR: 66  BP: 148/80  RR: 18  SpO2: 100%    Exam  General: NAD, AAO x3, no distress  Chest: breathing comfortably on room air  Abdomen: soft, non-tender, non- distended     Radiology & Additional Studies: Radiology imaging reviewed.     Labs:   -WBC 4.44 / HgB 10.1 / Hct 32.9 / Plt 242  -Na 142 / Cl 110 / BUN 10 / Glucose 89  -K 4.9 / CO2 24 / Cr 0.9  - / Alk Phos 308 / T.Bili 2.1  -INR1.02      EKG completed (date): 9/23/19    Height: n/a  Weight: n/a    Consentable: [ x ] Yes   [ ] No     Plan:   -54y Female presents for non-target liver biopsy with conscious sedation today 9/25  -Risks/Benefits/alternatives explained with the patient and/or healthcare proxy and witnessed informed consent obtained.

## 2019-09-25 NOTE — PROGRESS NOTE ADULT - SUBJECTIVE AND OBJECTIVE BOX
pt seen and examined. sp liver biopsy this am. feels great. has no pain. no n/v, no fever. no diarrhea. no constipation.     Vital Signs Last 24 Hrs  T(C): 36.2 (24 Sep 2019 23:54), Max: 36.2 (24 Sep 2019 23:54)  T(F): 97.2 (24 Sep 2019 23:54), Max: 97.2 (24 Sep 2019 23:54)  HR: 66 (24 Sep 2019 23:54) (66 - 96)  BP: 148/80 (24 Sep 2019 23:54) (131/80 - 148/80)  RR: 18 (24 Sep 2019 23:54) (18 - 18)  SpO2: 100% (24 Sep 2019 23:54) (100% - 100%)    Physical exam:   constitutional NAD, AAOX3, Respiratory  lungs CTA, CVS heart RRR, GI: abdomen Soft NT, ND, BS+, skin: intact  neuro exam non focal. slightly yellow sclera.                           10.1   4.44  )-----------( 242      ( 25 Sep 2019 06:01 )             32.9   09-25    142  |  110  |  10  ----------------------------<  89  4.9   |  24  |  0.9    Ca    8.9      25 Sep 2019 06:01  Mg     2.0     09-24    TPro  6.0  /  Alb  2.6<L>  /  TBili  2.1<H>  /  DBili  x   /  AST  1076<H>  /  ALT  611<H>  /  AlkPhos  308<H>  09-25    Acute Hepatitis Panel (09.24.19 @ 09:00)    Hepatitis C Virus Interpretation: Nonreact: Hepatitis C AB    Hepatitis C Virus S/CO Ratio: 0.14 S/CO    Hepatitis B Core IgM Antibody: Nonreact    Hepatitis B Surface Antigen: Nonreact    Hepatitis A IgM Antibody: Nonreact    < from: CT Abdomen and Pelvis w/ IV Cont (09.23.19 @ 17:07) >  No CT evidence of any acute inflammatory process within the abdomen or   pelvis.   Large bulky fibroid uterus, measuring 9.7 x 8.4 x 11.3 cm.  < end of copied text >    MEDICATIONS  (STANDING):  chlorhexidine 4% Liquid 1 Application(s) Topical <User Schedule>  enoxaparin Injectable 40 milliGRAM(s) SubCutaneous daily  losartan 50 milliGRAM(s) Oral daily  pantoprazole    Tablet 40 milliGRAM(s) Oral before breakfast  sodium chloride 0.9%. 1000 milliLiter(s) (100 mL/Hr) IV Continuous <Continuous>    a/p  #acute hepatitis. etiology unknown. LFT is improving slowly. repeat labs in am  [facility] gi    #HTN cont losartan, add hctz. monitor bp    #Progress Note Handoff  Pending (specify):  Consults__[facility] team __, Tests___LFT, bp _____, Test Results___liver biopsy   Family discussion: jackie pt, full code  Disposition: Home

## 2019-09-26 LAB
ALBUMIN SERPL ELPH-MCNC: 3.2 G/DL — LOW (ref 3.5–5.2)
ALP SERPL-CCNC: 341 U/L — HIGH (ref 30–115)
ALT FLD-CCNC: 679 U/L — HIGH (ref 0–41)
ANA PAT FLD IF-IMP: ABNORMAL
ANA TITR SER: ABNORMAL
ANION GAP SERPL CALC-SCNC: 12 MMOL/L — SIGNIFICANT CHANGE UP (ref 7–14)
AST SERPL-CCNC: 1142 U/L — HIGH (ref 0–41)
BASOPHILS # BLD AUTO: 0.08 K/UL — SIGNIFICANT CHANGE UP (ref 0–0.2)
BASOPHILS NFR BLD AUTO: 1.6 % — HIGH (ref 0–1)
BILIRUB SERPL-MCNC: 2.4 MG/DL — HIGH (ref 0.2–1.2)
BUN SERPL-MCNC: 12 MG/DL — SIGNIFICANT CHANGE UP (ref 10–20)
CALCIUM SERPL-MCNC: 9.2 MG/DL — SIGNIFICANT CHANGE UP (ref 8.5–10.1)
CHLORIDE SERPL-SCNC: 106 MMOL/L — SIGNIFICANT CHANGE UP (ref 98–110)
CO2 SERPL-SCNC: 23 MMOL/L — SIGNIFICANT CHANGE UP (ref 17–32)
CREAT SERPL-MCNC: 0.9 MG/DL — SIGNIFICANT CHANGE UP (ref 0.7–1.5)
EOSINOPHIL # BLD AUTO: 0.05 K/UL — SIGNIFICANT CHANGE UP (ref 0–0.7)
EOSINOPHIL NFR BLD AUTO: 1 % — SIGNIFICANT CHANGE UP (ref 0–8)
GLUCOSE SERPL-MCNC: 123 MG/DL — HIGH (ref 70–99)
HCT VFR BLD CALC: 34.2 % — LOW (ref 37–47)
HGB BLD-MCNC: 10.6 G/DL — LOW (ref 12–16)
IGG SERPL-MCNC: 2037 MG/DL — HIGH (ref 700–1600)
IGG1 SER-MCNC: 1382 MG/DL — HIGH (ref 248–810)
IGG2 SER-MCNC: 402 MG/DL — SIGNIFICANT CHANGE UP (ref 130–555)
IGG3 SER-MCNC: 157 MG/DL — HIGH (ref 15–102)
IGG4 SER-MCNC: 97 MG/DL — HIGH (ref 2–96)
IMM GRANULOCYTES NFR BLD AUTO: 1.4 % — HIGH (ref 0.1–0.3)
LKM AB SER-ACNC: <20.1 UNITS — SIGNIFICANT CHANGE UP (ref 0–20)
LYMPHOCYTES # BLD AUTO: 1.46 K/UL — SIGNIFICANT CHANGE UP (ref 1.2–3.4)
LYMPHOCYTES # BLD AUTO: 28.8 % — SIGNIFICANT CHANGE UP (ref 20.5–51.1)
MCHC RBC-ENTMCNC: 21.5 PG — LOW (ref 27–31)
MCHC RBC-ENTMCNC: 31 G/DL — LOW (ref 32–37)
MCV RBC AUTO: 69.5 FL — LOW (ref 81–99)
MONOCYTES # BLD AUTO: 0.52 K/UL — SIGNIFICANT CHANGE UP (ref 0.1–0.6)
MONOCYTES NFR BLD AUTO: 10.3 % — HIGH (ref 1.7–9.3)
NEUTROPHILS # BLD AUTO: 2.89 K/UL — SIGNIFICANT CHANGE UP (ref 1.4–6.5)
NEUTROPHILS NFR BLD AUTO: 56.9 % — SIGNIFICANT CHANGE UP (ref 42.2–75.2)
NRBC # BLD: 0 /100 WBCS — SIGNIFICANT CHANGE UP (ref 0–0)
PLATELET # BLD AUTO: 246 K/UL — SIGNIFICANT CHANGE UP (ref 130–400)
POTASSIUM SERPL-MCNC: 4.6 MMOL/L — SIGNIFICANT CHANGE UP (ref 3.5–5)
POTASSIUM SERPL-SCNC: 4.6 MMOL/L — SIGNIFICANT CHANGE UP (ref 3.5–5)
PROT SERPL-MCNC: 6.6 G/DL — SIGNIFICANT CHANGE UP (ref 6–8)
RBC # BLD: 4.92 M/UL — SIGNIFICANT CHANGE UP (ref 4.2–5.4)
RBC # FLD: 22.6 % — HIGH (ref 11.5–14.5)
SODIUM SERPL-SCNC: 141 MMOL/L — SIGNIFICANT CHANGE UP (ref 135–146)
WBC # BLD: 5.07 K/UL — SIGNIFICANT CHANGE UP (ref 4.8–10.8)
WBC # FLD AUTO: 5.07 K/UL — SIGNIFICANT CHANGE UP (ref 4.8–10.8)

## 2019-09-26 PROCEDURE — 99233 SBSQ HOSP IP/OBS HIGH 50: CPT

## 2019-09-26 PROCEDURE — 99232 SBSQ HOSP IP/OBS MODERATE 35: CPT

## 2019-09-26 RX ADMIN — LOSARTAN POTASSIUM 50 MILLIGRAM(S): 100 TABLET, FILM COATED ORAL at 05:40

## 2019-09-26 NOTE — PROGRESS NOTE ADULT - ASSESSMENT
Assessment and Plan:    55 YO F with a PMH of HTN and pre-DM who presents to the hospital with a c/o diaphoresis, SOB, and pre-syncope on day of presentation who was admitted for workup found to have elevated LFTs s/p liver biopsy.     #Transaminitis from unclear cause   - likely 2/2 to autoimmune pathology (CHULA is positive, IgG is elevated)  - s/p liver biospy on 9/25 by IR, prending results  - pending microsomal AB, acetohexamide  - pending CMV, EBV, Ferritin, HSV I & II, mitochondrial AB  - abdominal U/S negative   - will continue to Trend liver enzymes  - c/w IVF    #Hyperkalemia, resolved    #Hx of HTN and pre-DM  -C/w home meds    DVT: Lovenox  GI: Protonix   Code Status: Full code  Dispo: will anticipate for tomorrow

## 2019-09-26 NOTE — PROGRESS NOTE ADULT - ASSESSMENT
a/p:    #Transaminitis- unknown etiology  -suspect autoimmune 2/2 +CHULA and elevated IgG  -f/u results from liver biopsy (9/25)  -GI following  -avoid hepatoxic meds--no h/o etoh or drug use  -hep b/c negative  -cont to trend lft---increasing levels today- will monitor for next 24 hrs and anticipate dc home tomorrow  -will f/u with outpatient GI (Dr. Glenn Pearson)    DVT/GI ppx    FULL CODE  anticipate likely dc home in next 24 hrs once lft trending down    pcp- Dr. OBI Pearson ([facility])

## 2019-09-26 NOTE — PROGRESS NOTE ADULT - ASSESSMENT
# New onset mixed transaminitis:   No evidence of acute liver failure   DDX inflammatory vs infectious. 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 Elevated alkaline phosphatase could indicate possible overlap syndrome with PBC.  LFTs slightly increased today, possibly from biopsy.  INR within normal range  No encephalopathy/asterixis  No obvious inciting medications (patient was on losartan)  US did not show cholelithiasis or fatty liver or CBD dilation  CT scan showed no acute abdominopelvic process  Avoid APAP/ hepatotoxic drugs    To point,  HepA/B/C serologies are negative    - already obtained: acetohexamide level, CHULA, Acute hepatitis panel, Ferritin, Ig G subsets,  Iron and TIBC, antimicrosomal, ASMA    - please get anti liver-kidney antibodies, EBV, CMV, HSV, % sat of iron,  antimitochondrial ab, ceruloplasmin     If LFTs are downtrending, d/c in AM and obtain f/u LFTs in 1 week and hepatology clinic follow up in 2 weeks to get liver biopsy results    Colonic polyps:  Follow up with outpatient GI for 3 year colonoscopy # New onset mixed transaminitis:   No evidence of acute liver failure   DDX inflammatory vs infectious. 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 Elevated alkaline phosphatase could indicate possible overlap syndrome with PBC.  LFTs slightly increased today, possibly from biopsy. Will keep inpatient today and recheck LFTs in AM. If downtrending then discharge home  INR within normal range  No encephalopathy/asterixis  No obvious inciting medications (patient was on losartan)  US did not show cholelithiasis or fatty liver or CBD dilation  CT scan showed no acute abdominopelvic process  Avoid APAP/ hepatotoxic drugs    To point,  HepA/B/C serologies are negative    - already obtained: acetohexamide level, CHULA, Acute hepatitis panel, Ferritin, Ig G subsets,  Iron and TIBC, antimicrosomal, ASMA    - please get anti liver-kidney antibodies, EBV, CMV, HSV, % sat of iron,  antimitochondrial ab, ceruloplasmin     If LFTs are downtrending, d/c in AM and obtain f/u LFTs in 1 week and hepatology clinic follow up in 2 weeks to get liver biopsy results    Colonic polyps:  Follow up with outpatient GI for 3 year colonoscopy # New onset mixed transaminitis:   No evidence of acute liver failure   DDX inflammatory vs infectious. 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 Elevated alkaline phosphatase could indicate possible overlap syndrome with PBC.    INR within normal range  No encephalopathy/asterixis  No obvious inciting medications (patient was on losartan)  US did not show cholelithiasis or fatty liver or CBD dilation  CT scan showed no acute abdominopelvic process  Avoid APAP/ hepatotoxic drugs  LFTs slightly increased today, possibly from biopsy. Will keep inpatient today and recheck LFTs in AM. If downtrending then discharge home then obtain f/u LFTs in 1 week and hepatology clinic follow up in 2 weeks (vs. Primary GI, garcia) to get liver biopsy results        HepA/B/C serologies are negative    - already obtained: acetohexamide level, CHULA, Acute hepatitis panel, Ferritin, Ig G subsets,  Iron and TIBC, antimicrosomal, ASMA    - please get anti liver-kidney antibodies, EBV, CMV, HSV, % sat of iron,  antimitochondrial ab, ceruloplasmin          Colonic polyps:  Follow up with outpatient GI for 3 year colonoscopy

## 2019-09-26 NOTE — PROGRESS NOTE ADULT - SUBJECTIVE AND OBJECTIVE BOX
Gastroenterology progress note:     Patient is a 54y old  Female who presents with a chief complaint of Transaminitis (25 Sep 2019 12:15)       Admitted on: 09-23-19    We are following the patient for: transaminitis     Interval History:     Patient's medical problems are improving/stable/not improving/unstable/   Prior records reviewed (Y/N):  History obtained from someone other than patient (Y/N):      PAST MEDICAL & SURGICAL HISTORY:  Pre-diabetes  Hypertension  No significant past surgical history      MEDICATIONS  (STANDING):  chlorhexidine 4% Liquid 1 Application(s) Topical <User Schedule>  enoxaparin Injectable 40 milliGRAM(s) SubCutaneous daily  losartan 50 milliGRAM(s) Oral daily  pantoprazole    Tablet 40 milliGRAM(s) Oral before breakfast  sodium chloride 0.9%. 1000 milliLiter(s) (100 mL/Hr) IV Continuous <Continuous>    MEDICATIONS  (PRN):      Allergies  tetracycline (Rash)      Review of Systems:   Cardiovascular:  No Chest Pain, No Palpitations  Respiratory:  No Cough, No Dyspnea  Gastrointestinal:  As described in HPI    Physical Examination:  T(C): 36.6 (09-26-19 @ 12:44), Max: 36.6 (09-25-19 @ 23:47)  HR: 75 (09-26-19 @ 12:44) (66 - 80)  BP: 141/83 (09-26-19 @ 12:44) (122/77 - 155/82)  RR: 18 (09-26-19 @ 12:44) (18 - 20)  SpO2: 100% (09-25-19 @ 23:47) (100% - 100%)      Constitutional: No acute distress.  Respiratory:  No signs of respiratory distress. Lung sounds are clear bilaterally.  Cardiovascular:  S1 S2, Regular rate and rhythm.  Abdominal: Abdomen is soft, symmetric, and non-tender without distention. There are no visible lesions or scars. Bowel sounds are present and normoactive in all four quadrants. No masses, hepatomegaly, or splenomegaly are noted.   Skin: No rashes, No Jaundice.        Data: (reviewed by attending)                        10.6   5.07  )-----------( 246      ( 26 Sep 2019 12:28 )             34.2     Hgb trend:  10.6  09-26-19 @ 12:28  10.1  09-25-19 @ 06:01  11.7  09-23-19 @ 14:00        09-26    141  |  106  |  12  ----------------------------<  123<H>  4.6   |  23  |  0.9    Ca    9.2      26 Sep 2019 12:28    TPro  6.6  /  Alb  3.2<L>  /  TBili  2.4<H>  /  DBili  x   /  AST  1142<H>  /  ALT  679<H>  /  AlkPhos  341<H>  09-26    Liver panel trend:  TBili 2.4   /   AST 1142   /      /   AlkP 341   /   Tptn 6.6   /   Alb 3.2    /   DBili --      09-26  TBili 2.1   /   AST 1076   /      /   AlkP 308   /   Tptn 6.0   /   Alb 2.6    /   DBili --      09-25  TBili 2.6   /   AST 1211   /      /   AlkP 327   /   Tptn 6.7   /   Alb 3.1    /   DBili 1.9      09-24  TBili 3.2   /   AST 1371   /      /   AlkP 335   /   Tptn 7.6   /   Alb 3.2    /   DBili --      09-23          Culture - Urine (collected 23 Sep 2019 14:40)  Source: .Urine Clean Catch (Midstream)  Final Report (25 Sep 2019 06:39):    >=3 organisms. Probable collection contamination.         Radiology: (reviewed by attending) Gastroenterology progress note:     Patient is a 54y old  Female who presents with a chief complaint of Transaminitis (25 Sep 2019 12:15)       Admitted on: 09-23-19    We are following the patient for: transaminitis     Interval History: No events. patient feels well    Patient's medical problems are improving/stable/not improving/unstable/   Prior records reviewed (Y/N):Y  History obtained from someone other than patient (Y/N): N      PAST MEDICAL & SURGICAL HISTORY:  Pre-diabetes  Hypertension  No significant past surgical history      MEDICATIONS  (STANDING):  chlorhexidine 4% Liquid 1 Application(s) Topical <User Schedule>  enoxaparin Injectable 40 milliGRAM(s) SubCutaneous daily  losartan 50 milliGRAM(s) Oral daily  pantoprazole    Tablet 40 milliGRAM(s) Oral before breakfast  sodium chloride 0.9%. 1000 milliLiter(s) (100 mL/Hr) IV Continuous <Continuous>    MEDICATIONS  (PRN):      Allergies  tetracycline (Rash)      Review of Systems:   Cardiovascular:  No Chest Pain, No Palpitations  Respiratory:  No Cough, No Dyspnea  Gastrointestinal:  As described in HPI    Physical Examination:  T(C): 36.6 (09-26-19 @ 12:44), Max: 36.6 (09-25-19 @ 23:47)  HR: 75 (09-26-19 @ 12:44) (66 - 80)  BP: 141/83 (09-26-19 @ 12:44) (122/77 - 155/82)  RR: 18 (09-26-19 @ 12:44) (18 - 20)  SpO2: 100% (09-25-19 @ 23:47) (100% - 100%)      Constitutional: No acute distress.  Respiratory:  No signs of respiratory distress. Lung sounds are clear bilaterally.  Cardiovascular:  S1 S2, Regular rate and rhythm.  Abdominal: Abdomen is soft, symmetric, and non-tender without distention. There are no visible lesions or scars. Bowel sounds are present and normoactive in all four quadrants. No masses, hepatomegaly, or splenomegaly are noted.   Skin: No rashes, No Jaundice.        Data: (reviewed by attending)                        10.6   5.07  )-----------( 246      ( 26 Sep 2019 12:28 )             34.2     Hgb trend:  10.6  09-26-19 @ 12:28  10.1  09-25-19 @ 06:01  11.7  09-23-19 @ 14:00        09-26    141  |  106  |  12  ----------------------------<  123<H>  4.6   |  23  |  0.9    Ca    9.2      26 Sep 2019 12:28    TPro  6.6  /  Alb  3.2<L>  /  TBili  2.4<H>  /  DBili  x   /  AST  1142<H>  /  ALT  679<H>  /  AlkPhos  341<H>  09-26    Liver panel trend:  TBili 2.4   /   AST 1142   /      /   AlkP 341   /   Tptn 6.6   /   Alb 3.2    /   DBili --      09-26  TBili 2.1   /   AST 1076   /      /   AlkP 308   /   Tptn 6.0   /   Alb 2.6    /   DBili --      09-25  TBili 2.6   /   AST 1211   /      /   AlkP 327   /   Tptn 6.7   /   Alb 3.1    /   DBili 1.9      09-24  TBili 3.2   /   AST 1371   /      /   AlkP 335   /   Tptn 7.6   /   Alb 3.2    /   DBili --      09-23          Culture - Urine (collected 23 Sep 2019 14:40)  Source: .Urine Clean Catch (Midstream)  Final Report (25 Sep 2019 06:39):    >=3 organisms. Probable collection contamination.

## 2019-09-26 NOTE — PROGRESS NOTE ADULT - SUBJECTIVE AND OBJECTIVE BOX
Patient is a 54y old  Female who presents with a chief complaint of Transaminitis (26 Sep 2019 13:59)    HPI:  53 yo F w PMhx of HTN and pre DM presents to the ED for an episode of nausea, diaphoresis, and light-headedness. Patient reports an episode of sudden onset light-headedness, nausea, and diaphoresis today while ironing her clothes; she had another episode last week with the same complains. no orthostatic hypotension, not exacerbated by head movements. she also endorses fatigue decreased po intake with occasional nausea over the past few weeks. pt also reports dark urine but normal colored stool. pt endorses she newly diagnosed elevated LFTs as outpatient; with work up still in progress. pt denies recent travel, over counter medication use, IV drug use. pt is monogamous with 1 sexual partner with no protection. pt also reports 1 year ago she was seen by rheumatologist after she was referred by ophthalmologist for red eye and working diagnosis was mixed connective tissue disease. pt didn't follow up and currently takes no medications. pt had colonoscopy 8/2019 with 2 polyps and hemorrhoids and was told to follow up in 3 years. pt sees Dr. Glenn Pearson GI.      in the ED, pt is hemodynamically stable.  initial blood work with transaminitis.  CT abdomen with no acute pathology. (23 Sep 2019 22:53)    PAST MEDICAL & SURGICAL HISTORY:  Pre-diabetes  Hypertension  No significant past surgical history    patient seen and examined independently on morning rounds for the first time today, chart reviewed and discussed with the medicine resident.    s/p IR liver biopsy yesterday- minimal tenderness at site- awaiting labs for today to trend lft    Vital Signs Last 24 Hrs  T(C): 36.6 (26 Sep 2019 12:44), Max: 36.6 (25 Sep 2019 23:47)  T(F): 97.8 (26 Sep 2019 12:44), Max: 97.8 (25 Sep 2019 23:47)  HR: 75 (26 Sep 2019 12:44) (66 - 80)  BP: 141/83 (26 Sep 2019 12:44) (122/77 - 155/82)  BP(mean): --  RR: 18 (26 Sep 2019 12:44) (18 - 20)  SpO2: 100% (25 Sep 2019 23:47) (100% - 100%)             PE:  GEN-NAD, AAOx3  PULM- Clear to auscultation bilaterally, fair air entry  CVS- +s1/s2 RRR no murmurs  GI- soft NT ND +bs, no rebound, no guarding, IR site c/d/i  EXT- no edema               10.6   5.07  )-----------( 246      ( 26 Sep 2019 12:28 )             34.2     09-26    141  |  106  |  12  ----------------------------<  123<H>  4.6   |  23  |  0.9    Ca    9.2      26 Sep 2019 12:28    TPro  6.6  /  Alb  3.2<L>  /  TBili  2.4<H>  /  DBili  x   /  AST  1142<H>  /  ALT  679<H>  /  AlkPhos  341<H>  09-26              MEDICATIONS  (STANDING):  chlorhexidine 4% Liquid 1 Application(s) Topical <User Schedule>  enoxaparin Injectable 40 milliGRAM(s) SubCutaneous daily  losartan 50 milliGRAM(s) Oral daily  pantoprazole    Tablet 40 milliGRAM(s) Oral before breakfast  sodium chloride 0.9%. 1000 milliLiter(s) (100 mL/Hr) IV Continuous <Continuous>

## 2019-09-26 NOTE — PROGRESS NOTE ADULT - SUBJECTIVE AND OBJECTIVE BOX
Hospital Day:  3d    Subjective:    Patient is a 54y old  Female who presents with a chief complaint of Transaminitis. Overnight, no acute events. This       Past Medical Hx:   Pre-diabetes  Hypertension    Past Sx:  No significant past surgical history    Allergies:  tetracycline (Rash)    Current Meds:   Standng Meds:  chlorhexidine 4% Liquid 1 Application(s) Topical <User Schedule>  enoxaparin Injectable 40 milliGRAM(s) SubCutaneous daily  losartan 50 milliGRAM(s) Oral daily  pantoprazole    Tablet 40 milliGRAM(s) Oral before breakfast  sodium chloride 0.9%. 1000 milliLiter(s) (100 mL/Hr) IV Continuous <Continuous>    PRN Meds:    HOME MEDICATIONS:  Benicar 20 mg oral tablet: 1 tab(s) orally once a day      Vital Signs:   T(F): 97.8 (19 @ 12:44), Max: 97.8 (19 @ 23:47)  HR: 75 (19 @ 12:44) (66 - 80)  BP: 141/83 (19 @ 12:44) (122/77 - 155/82)  RR: 18 (19 @ 12:44) (18 - 20)  SpO2: 100% (19 @ 23:47) (100% - 100%)        Physical Exam:   GENERAL: NAD  HEENT: NCAT  CHEST/LUNG: CTAB  HEART: Regular rate and rhythm; s1 s2 appreciated, No murmurs, rubs, or gallops  ABDOMEN: Soft, Nontender, Nondistended; Bowel sounds present  EXTREMITIES: No LE edema b/l  NERVOUS SYSTEM:  Alert & Oriented X3        Labs:                         10.6   5.07  )-----------( 246      ( 26 Sep 2019 12:28 )             34.2     Neutophil% 56.9, Lymphocyte% 28.8, Monocyte% 10.3, Bands% 1.4 19 @ 12:28    26 Sep 2019 12:28    141    |  106    |  12     ----------------------------<  123    4.6     |  23     |  0.9      Ca    9.2        26 Sep 2019 12:28    TPro  6.6    /  Alb  3.2    /  TBili  2.4    /  DBili  x      /  AST  1142   /  ALT  679    /  AlkPhos  341    26 Sep 2019 12:28        Amylase --, Lipase 25, 19 @ 13:10    Troponin <0.01, CKMB --, CK -- 19 @ 13:10    Iron 55, TIBC 337, %Sat 16 Ferritin -- 19 @ 06:01  Iron --, TIBC --, %Sat -- Ferritin 79 19 @ 09:00      Urinalysis Basic - ( 23 Sep 2019 14:40 )    Color: Filomena / Appearance: Slightly Turbid / S.022 / pH: x  Gluc: x / Ketone: Small  / Bili: Small / Urobili: 6 mg/dL   Blood: x / Protein: 30 mg/dL / Nitrite: Negative   Leuk Esterase: Negative / RBC: 0 /HPF / WBC 2 /HPF   Sq Epi: x / Non Sq Epi: 9 /HPF / Bacteria: Few            Radiology:       Assessment and Plan:     DVT ppx:    GI ppx:     Code Status:     DISPO: Hospital Day:  3d    Subjective:    Patient is a 54y old  Female who presents with a chief complaint of Transaminitis. Overnight, no acute events. This morning she is resting comfortably. She denies any current symptoms and states she really wants to go home.        Past Medical Hx:  Pre-diabetes  Hypertension    Past Sx:  No significant past surgical history    Allergies:  tetracycline (Rash)    Current Meds:  Standng Meds:  chlorhexidine 4% Liquid 1 Application(s) Topical <User Schedule>  enoxaparin Injectable 40 milliGRAM(s) SubCutaneous daily  losartan 50 milliGRAM(s) Oral daily  pantoprazole    Tablet 40 milliGRAM(s) Oral before breakfast  sodium chloride 0.9%. 1000 milliLiter(s) (100 mL/Hr) IV Continuous <Continuous>    PRN Meds:    HOME MEDICATIONS:  Benicar 20 mg oral tablet: 1 tab(s) orally once a day      Vital Signs:   T(F): 97.8 (19 @ 12:44), Max: 97.8 (19 @ 23:47)  HR: 75 (19 @ 12:44) (66 - 80)  BP: 141/83 (19 @ 12:44) (122/77 - 155/82)  RR: 18 (19 @ 12:44) (18 - 20)  SpO2: 100% (19 @ 23:47) (100% - 100%)        Physical Exam:   GENERAL: NAD  HEENT: NCAT  CHEST/LUNG: CTA b/l  HEART: Regular rate and rhythm; s1 s2 appreciated, No murmurs, rubs, or gallops  ABDOMEN: Soft, Nontender, Nondistended; Bowel sounds present  EXTREMITIES: No LE edema b/l  NERVOUS SYSTEM:  Alert & Oriented X3        Labs:                         10.6   5.07  )-----------( 246      ( 26 Sep 2019 12:28 )             34.2     Neutophil% 56.9, Lymphocyte% 28.8, Monocyte% 10.3, Bands% 1.4 19 @ 12:28    26 Sep 2019 12:28    141    |  106    |  12     ----------------------------<  123    4.6     |  23     |  0.9      Ca    9.2        26 Sep 2019 12:28    TPro  6.6    /  Alb  3.2    /  TBili  2.4    /  DBili  x      /  AST  1142   /  ALT  679    /  AlkPhos  341    26 Sep 2019 12:28        Amylase --, Lipase 25, 19 @ 13:10    Troponin <0.01, CKMB --, CK -- 19 @ 13:10    Iron 55, TIBC 337, %Sat 16 Ferritin -- 19 @ 06:01  Iron --, TIBC --, %Sat -- Ferritin 79 19 @ 09:00      Urinalysis Basic - ( 23 Sep 2019 14:40 )    Color: Filomena / Appearance: Slightly Turbid / S.022 / pH: x  Gluc: x / Ketone: Small  / Bili: Small / Urobili: 6 mg/dL   Blood: x / Protein: 30 mg/dL / Nitrite: Negative   Leuk Esterase: Negative / RBC: 0 /HPF / WBC 2 /HPF   Sq Epi: x / Non Sq Epi: 9 /HPF / Bacteria: Few            Radiology:    < from: US Abdomen Limited (19 @ 06:49) >  IMPRESSION:  No cholelithiasis or sonographic evidence of acute cholecystitis. No   biliary ductal dilatation.  < end of copied text >

## 2019-09-27 ENCOUNTER — TRANSCRIPTION ENCOUNTER (OUTPATIENT)
Age: 54
End: 2019-09-27

## 2019-09-27 VITALS
DIASTOLIC BLOOD PRESSURE: 80 MMHG | HEART RATE: 77 BPM | RESPIRATION RATE: 18 BRPM | SYSTOLIC BLOOD PRESSURE: 128 MMHG | TEMPERATURE: 98 F

## 2019-09-27 LAB
ALBUMIN SERPL ELPH-MCNC: 2.7 G/DL — LOW (ref 3.5–5.2)
ALP SERPL-CCNC: 299 U/L — HIGH (ref 30–115)
ALT FLD-CCNC: 609 U/L — HIGH (ref 0–41)
ANION GAP SERPL CALC-SCNC: 9 MMOL/L — SIGNIFICANT CHANGE UP (ref 7–14)
AST SERPL-CCNC: 1046 U/L — HIGH (ref 0–41)
BASOPHILS # BLD AUTO: 0.08 K/UL — SIGNIFICANT CHANGE UP (ref 0–0.2)
BASOPHILS NFR BLD AUTO: 1.7 % — HIGH (ref 0–1)
BILIRUB SERPL-MCNC: 2.3 MG/DL — HIGH (ref 0.2–1.2)
BUN SERPL-MCNC: 9 MG/DL — LOW (ref 10–20)
CALCIUM SERPL-MCNC: 8.8 MG/DL — SIGNIFICANT CHANGE UP (ref 8.5–10.1)
CHLORIDE SERPL-SCNC: 108 MMOL/L — SIGNIFICANT CHANGE UP (ref 98–110)
CO2 SERPL-SCNC: 24 MMOL/L — SIGNIFICANT CHANGE UP (ref 17–32)
CREAT SERPL-MCNC: 0.9 MG/DL — SIGNIFICANT CHANGE UP (ref 0.7–1.5)
EOSINOPHIL # BLD AUTO: 0.08 K/UL — SIGNIFICANT CHANGE UP (ref 0–0.7)
EOSINOPHIL NFR BLD AUTO: 1.7 % — SIGNIFICANT CHANGE UP (ref 0–8)
FERRITIN SERPL-MCNC: 72 NG/ML — SIGNIFICANT CHANGE UP (ref 15–150)
GLUCOSE SERPL-MCNC: 91 MG/DL — SIGNIFICANT CHANGE UP (ref 70–99)
HCT VFR BLD CALC: 33.5 % — LOW (ref 37–47)
HGB BLD-MCNC: 10.2 G/DL — LOW (ref 12–16)
IMM GRANULOCYTES NFR BLD AUTO: 1.3 % — HIGH (ref 0.1–0.3)
LYMPHOCYTES # BLD AUTO: 1.47 K/UL — SIGNIFICANT CHANGE UP (ref 1.2–3.4)
LYMPHOCYTES # BLD AUTO: 31.7 % — SIGNIFICANT CHANGE UP (ref 20.5–51.1)
MCHC RBC-ENTMCNC: 21.2 PG — LOW (ref 27–31)
MCHC RBC-ENTMCNC: 30.4 G/DL — LOW (ref 32–37)
MCV RBC AUTO: 69.5 FL — LOW (ref 81–99)
MONOCYTES # BLD AUTO: 0.59 K/UL — SIGNIFICANT CHANGE UP (ref 0.1–0.6)
MONOCYTES NFR BLD AUTO: 12.7 % — HIGH (ref 1.7–9.3)
NEUTROPHILS # BLD AUTO: 2.36 K/UL — SIGNIFICANT CHANGE UP (ref 1.4–6.5)
NEUTROPHILS NFR BLD AUTO: 50.9 % — SIGNIFICANT CHANGE UP (ref 42.2–75.2)
NRBC # BLD: 0 /100 WBCS — SIGNIFICANT CHANGE UP (ref 0–0)
PLATELET # BLD AUTO: 245 K/UL — SIGNIFICANT CHANGE UP (ref 130–400)
POTASSIUM SERPL-MCNC: 4.9 MMOL/L — SIGNIFICANT CHANGE UP (ref 3.5–5)
POTASSIUM SERPL-SCNC: 4.9 MMOL/L — SIGNIFICANT CHANGE UP (ref 3.5–5)
PROT SERPL-MCNC: 6 G/DL — SIGNIFICANT CHANGE UP (ref 6–8)
RBC # BLD: 4.82 M/UL — SIGNIFICANT CHANGE UP (ref 4.2–5.4)
RBC # FLD: 23 % — HIGH (ref 11.5–14.5)
SODIUM SERPL-SCNC: 141 MMOL/L — SIGNIFICANT CHANGE UP (ref 135–146)
WBC # BLD: 4.64 K/UL — LOW (ref 4.8–10.8)
WBC # FLD AUTO: 4.64 K/UL — LOW (ref 4.8–10.8)

## 2019-09-27 PROCEDURE — 99239 HOSP IP/OBS DSCHRG MGMT >30: CPT

## 2019-09-27 RX ADMIN — LOSARTAN POTASSIUM 50 MILLIGRAM(S): 100 TABLET, FILM COATED ORAL at 05:36

## 2019-09-27 NOTE — PROGRESS NOTE ADULT - SUBJECTIVE AND OBJECTIVE BOX
<<<RESIDENT DISCHARGE NOTE>>>     DANNA PERERA  MRN-[id number]    VITAL SIGNS:  T(F): 97.2 (09-27-19 @ 04:00), Max: 99 (09-26-19 @ 20:32)  HR: 70 (09-27-19 @ 04:00)  BP: 140/80 (09-27-19 @ 05:42)  SpO2: --      PHYSICAL EXAMINATION:  General: NAD  Head & Neck: NCAT  Pulmonary: CTA b/l  Cardiovascular: Regular rate & rhythm, normal s1 and s2  Gastrointestinal/Abdomen & Pelvis: Soft, non tender, non distended, normal bowel sounds  Neurologic/Motor: A&O x 3    TEST RESULTS:                        10.2   4.64  )-----------( 245      ( 27 Sep 2019 07:35 )             33.5       09-27    141  |  108  |  9<L>  ----------------------------<  91  4.9   |  24  |  0.9    Ca    8.8      27 Sep 2019 07:35    TPro  6.0  /  Alb  2.7<L>  /  TBili  2.3<H>  /  DBili  x   /  AST  1046<H>  /  ALT  609<H>  /  AlkPhos  299<H>  09-27      FINAL DISCHARGE INTERVIEW:  Resident(s) Present: (Name: Jose Green), RN Present: (Name: Yesenia)    DISCHARGE MEDICATION RECONCILIATION  reviewed with Attending (Name: Dr. Freeman)    DISPOSITION:   [xx] Home,    [  ] Home with Visiting Nursing Services,   [    ]  SNF/ NH,    [   ] Acute Rehab (4A),   [   ] Other (Specify:_________)

## 2019-09-27 NOTE — DISCHARGE NOTE PROVIDER - PROVIDER TOKENS
FREE:[LAST:[Pearson],FIRST:[Glenn H],PHONE:[phone]],FAX:[(   )    -],ADDRESS:[Gastroenterologist]],PROVIDER:[TOKEN:[[id number]]] PROVIDER:[TOKEN:[[id number]]],FREE:[LAST:[Pearson],FIRST:[Glenn H],PHONE:[phone]],FAX:[(   )    -],ADDRESS:[Gastroenterologist]],PROVIDER:[TOKEN:[[id number]]]

## 2019-09-27 NOTE — PROGRESS NOTE ADULT - SUBJECTIVE AND OBJECTIVE BOX
patient seen and examined independently on morning rounds, chart reviewed and discussed with medicine resident and agree with the above discharge note and please refer to my progress note from today for additional information.    time spendt on discharge >30 minutes including coordination of discharge care    discharge home today- plan for f/u with outpatient pcp and       Patient is a 54y old  Female who presents with a chief complaint of Transaminitis (26 Sep 2019 13:59)    HPI:  55 yo F w PMhx of HTN and pre DM presents to the ED for an episode of nausea, diaphoresis, and light-headedness. Patient reports an episode of sudden onset light-headedness, nausea, and diaphoresis today while ironing her clothes; she had another episode last week with the same complains. no orthostatic hypotension, not exacerbated by head movements. she also endorses fatigue decreased po intake with occasional nausea over the past few weeks. pt also reports dark urine but normal colored stool. pt endorses she newly diagnosed elevated LFTs as outpatient; with work up still in progress. pt denies recent travel, over counter medication use, IV drug use. pt is monogamous with 1 sexual partner with no protection. pt also reports 1 year ago she was seen by rheumatologist after she was referred by ophthalmologist for red eye and working diagnosis was mixed connective tissue disease. pt didn't follow up and currently takes no medications. pt had colonoscopy 8/2019 with 2 polyps and hemorrhoids and was told to follow up in 3 years. pt sees Dr. Glenn YE.      in the ED, pt is hemodynamically stable.  initial blood work with transaminitis.  CT abdomen with no acute pathology. (23 Sep 2019 22:53)    PAST MEDICAL & SURGICAL HISTORY:  Pre-diabetes  Hypertension  No significant past surgical history    patient seen and examined independently on morning rounds, chart reviewed and discussed with the medicine resident.    s/p IR liver biopsy 9/25- no overnight events- stable for discharge home                 PE:  GEN-NAD, AAOx3  PULM- Clear to auscultation bilaterally, fair air entry  CVS- +s1/s2 RRR no murmurs  GI- soft NT ND +bs, no rebound, no guarding, IR site c/d/i  EXT- no edema               10.6   5.07  )-----------( 246      ( 26 Sep 2019 12:28 )             34.2     09-26    141  |  106  |  12  ----------------------------<  123<H>  4.6   |  23  |  0.9    Ca    9.2      26 Sep 2019 12:28    TPro  6.6  /  Alb  3.2<L>  /  TBili  2.4<H>  /  DBili  x   /  AST  1142<H>  /  ALT  679<H>  /  AlkPhos  341<H>  09-26              MEDICATIONS  (STANDING):  chlorhexidine 4% Liquid 1 Application(s) Topical <User Schedule>  enoxaparin Injectable 40 milliGRAM(s) SubCutaneous daily  losartan 50 milliGRAM(s) Oral daily  pantoprazole    Tablet 40 milliGRAM(s) Oral before breakfast  sodium chloride 0.9%. 1000 milliLiter(s) (100 mL/Hr) IV Continuous <Continuous>

## 2019-09-27 NOTE — DISCHARGE NOTE PROVIDER - HOSPITAL COURSE
53 YO F with a PMH of HTN and pre-DM who presents to the hospital with a c/o diaphoresis, SOB, and pre-syncope while getting ready for work 2 days ago The pt states she has had several episodes over the past couple weeks. Associated with dark brown urine, decreased PO intake occasional nausea.         In the ED patient was afebrile, hemodynamically stable. Labs remarkable for K of 6.8 (hemolyzed). T bili of 3.2 (direct 1.9), alk phos 335, AST 1371, . Ultrasound of the abdomen was negative for pathology. CT A/P C+ was negative for acute pathology. GI was consulted and further lab work was sent. Of the labs that came back, CHULA was positive, homogeneous pattern. IgG was elevated. IR was consulted and liver biopsy was done on 9/25/19. Post procedure patient had no complications and remained asymptomatic. LFTs downtrended and patient was discharged to home with follow up with GI within 1 week for results of her biopsy and lab work.

## 2019-09-27 NOTE — DISCHARGE NOTE NURSING/CASE MANAGEMENT/SOCIAL WORK - NSDCFUADDAPPT_GEN_ALL_CORE_FT
[facility]  [address] [place] [id number] [phone]

## 2019-09-27 NOTE — DISCHARGE NOTE PROVIDER - CARE PROVIDERS DIRECT ADDRESSES
,DirectAddress_Unknown,DirectAddress_Unknown ,[email or web address],DirectAddress_Unknown,[email or web address]

## 2019-09-27 NOTE — DISCHARGE NOTE PROVIDER - CARE PROVIDER_API CALL
Glenn Pearson  Gastroenterologist  Phone: [phone]  Fax: (   )    -  Follow Up Time:     Frandy Pearson (MD)  Internal Medicine  [address] [place] [address]  Phone: [phone]  Fax: [phone]  Follow Up Time: Caesar Pearson)  Geriatric Medicine; Internal Medicine  [address] [place] [address]  Phone: [phone]  Fax: [phone]  Follow Up Time:     Glenn Pearson  Gastroenterologist  Phone: [phone]  Fax: (   )    -  Follow Up Time:     Osvaldo Porras)  Gastroenterology  [address] [place] [address]  Phone: [phone]  Fax: [phone]  Follow Up Time:

## 2019-09-27 NOTE — DISCHARGE NOTE PROVIDER - NSDCCPCAREPLAN_GEN_ALL_CORE_FT
PRINCIPAL DISCHARGE DIAGNOSIS  Diagnosis: Transaminitis  Assessment and Plan of Treatment: You were found to have elevated liver enzymes on admission to the hospital. Labs were sent and biopsy of the liver was done.   Please follow up with your Primary care doctor on Tuesday, 10/1/19 to have lab work repeated to check your liver enzyme levels.   Please follow up with your GI doctor on Wednesday, 10/2/19 for the results of these tests. Please avoid alcoholic beverages and tylenol as these can make your liver enzymes worse.      SECONDARY DISCHARGE DIAGNOSES  Diagnosis: Pre-diabetes  Assessment and Plan of Treatment: You have a history of pre-diabetes. Please follow up with your primary care doctor in 1-2 weeks.    Diagnosis: HTN (hypertension)  Assessment and Plan of Treatment: You have a history of hypertension. Please continue to take your blood pressure medication. Follow up with your primary care doctor in 1-2 weeks.

## 2019-09-27 NOTE — DISCHARGE NOTE NURSING/CASE MANAGEMENT/SOCIAL WORK - PATIENT PORTAL LINK FT
You can access the FollowMyHealth Patient Portal offered by [facility] by registering at the following website: [email or web address]. By joining [employer]’s FollowMyHealth portal, you will also be able to view your health information using other applications (apps) compatible with our system.

## 2019-09-27 NOTE — DISCHARGE NOTE PROVIDER - NSDCFUADDINST_GEN_ALL_CORE_FT
Please call your primary care doctor to have the following lab work done on 10/1/19: complete metabolic panel.

## 2019-09-27 NOTE — PROGRESS NOTE ADULT - ASSESSMENT
a/p:  time spendt on discharge >30 minutes including coordination of discharge care    discharge home today- plan for f/u with outpatient pcp and GI    #Transaminitis- unknown etiology  -suspect autoimmune 2/2 +CHULA and elevated IgG  -f/u results from liver biopsy (9/25)  -GI following--patient to f/u with her own pcp Dr. Glenn Pearson ([facility])--informed him of patient admission as well via phone call today  -avoid hepatoxic meds--no h/o etoh or drug use  -hep b/c negative  -cont to trend lft---levels slightly down today    #HTN- back on benicar on discharge     DVT/GI ppx    FULL CODE  DISCHARGE home today    pcp- Dr. OBI Pearson ([facility])/GI Dr. Glenn Pearson a/p:  time spendt on discharge >30 minutes including coordination of discharge care    discharge home today- plan for f/u with outpatient pcp and GI    #Transaminitis- unknown etiology  -suspect autoimmune 2/2 +CHULA and elevated IgG  -f/u results from liver biopsy (9/25)  -GI following--patient to f/u with her own pcp and GI--left msg for outpatient GI Dr. Glenn Pearson   -avoid hepatoxic meds--no h/o etoh or drug use  -hep b/c negative  -cont to trend lft---levels slightly down today    #HTN- back on benicar on discharge     DVT/GI ppx    FULL CODE  DISCHARGE home today    pcp- Dr. OBI Pearson ([facility])/GI Dr. Glenn Pearson

## 2019-09-27 NOTE — PROGRESS NOTE ADULT - PROVIDER SPECIALTY LIST ADULT
Gastroenterology
Gastroenterology
Hospitalist
Internal Medicine
Internal Medicine
Intervent Radiology
Intervent Radiology
Hospitalist

## 2019-09-28 LAB
EBV EA AB SER IA-ACNC: 41.8 U/ML — HIGH
EBV EA AB TITR SER IF: POSITIVE
EBV EA IGG SER-ACNC: POSITIVE
EBV NA IGG SER IA-ACNC: >600 U/ML — HIGH
EBV PATRN SPEC IB-IMP: SIGNIFICANT CHANGE UP
EBV VCA IGG AVIDITY SER QL IA: POSITIVE
EBV VCA IGM SER IA-ACNC: 365 U/ML — HIGH
EBV VCA IGM SER IA-ACNC: <10 U/ML — SIGNIFICANT CHANGE UP
EBV VCA IGM TITR FLD: NEGATIVE — SIGNIFICANT CHANGE UP
HSV1 IGG SER-ACNC: 49 INDEX — HIGH
HSV1 IGG SERPL QL IA: POSITIVE
HSV2 IGG FLD-ACNC: 0.18 INDEX — SIGNIFICANT CHANGE UP
HSV2 IGG SERPL QL IA: NEGATIVE — SIGNIFICANT CHANGE UP

## 2019-09-30 LAB
ACETOHEXAMIDE SERPL-MCNC: <0.5 CD:431152031 — LOW
CMV DNA CSF QL NAA+PROBE: SIGNIFICANT CHANGE UP
CMV DNA SPEC NAA+PROBE-LOG#: SIGNIFICANT CHANGE UP LOGIU/ML
MITOCHONDRIA AB SER-ACNC: SIGNIFICANT CHANGE UP
SURGICAL PATHOLOGY STUDY: SIGNIFICANT CHANGE UP

## 2019-10-01 DIAGNOSIS — R73.03 PREDIABETES: ICD-10-CM

## 2019-10-01 DIAGNOSIS — I10 ESSENTIAL (PRIMARY) HYPERTENSION: ICD-10-CM

## 2019-10-01 DIAGNOSIS — M35.9 SYSTEMIC INVOLVEMENT OF CONNECTIVE TISSUE, UNSPECIFIED: ICD-10-CM

## 2019-10-01 DIAGNOSIS — R94.5 ABNORMAL RESULTS OF LIVER FUNCTION STUDIES: ICD-10-CM

## 2019-10-01 DIAGNOSIS — Z88.3 ALLERGY STATUS TO OTHER ANTI-INFECTIVE AGENTS: ICD-10-CM

## 2019-12-11 ENCOUNTER — RESULT REVIEW (OUTPATIENT)
Age: 54
End: 2019-12-11

## 2020-04-02 NOTE — PROGRESS NOTE ADULT - ASSESSMENT
53 yo F w PMHx of HTN and pre DM admitted for transaminitis    #Transaminitis of unknown etiology  ddx includes: autoimmune vs. infectious vs. drug induced liver injury vs. NAFLD    LFTs downtrending  no evidence of acute liver failure  CT and US negative for pathology    -trend liver enzymes  -f/u hepatitis panel   -avoid hepatotoxic medications  -will give gentle hydration with NS @ 100cc/hr x1day  -IR for non targeted liver biopsy; on the schedule for tomorrow will keep NPO after midnight     #HTN    well controlled    -continue losartan 100mg PO daily    #pre-DM    -continue lifestyle modifications and outpt follow up with PCP    #hyperkalemia  Potassium, Serum: 4.3 mmol/L (09.24.19 @ 04:30)    resolved likely 2/2 hemolyzed blood sample  cont losartan at this time    #Healthcare maintenance  DVT ppx   GI ppx with protonix PRN [General Appearance - Well Developed] : well developed [Normal Appearance] : normal appearance 53 yo F w PMHx of HTN and pre DM admitted for transaminitis    #Transaminitis of unknown etiology  ddx includes: autoimmune vs. infectious vs. drug induced liver injury vs. NAFLD    LFTs downtrending  no evidence of acute liver failure  CT and US negative for pathology    -trend liver enzymes  -g/u GI for further recommendations   -f/u hepatitis panel   -avoid hepatotoxic medications  -will give gentle hydration with NS @ 100cc/hr x1day  -IR for non targeted liver biopsy; on the schedule for tomorrow will keep NPO after midnight     #HTN    well controlled    -continue losartan 100mg PO daily while admitted     #pre-DM    -continue lifestyle modifications and outpt follow up with PCP    #hyperkalemia  Potassium, Serum: 4.3 mmol/L (09.24.19 @ 04:30)    resolved likely 2/2 hemolyzed blood sample  cont losartan at this time    #Healthcare maintenance  DVT ppx with Lovenox   GI ppx with protonix PRN    Progress Note Handoff  Pending:  IR guided liver biopsy (planned for 9/25)  Patient/Family discussion: extensive discussion with patient and patients mother at bedside this afternoon. Family was updated on US guided biopsy to likely be performed tomorrow and request to remain NPO after midnight tonight for mentioned procedure. Patient inquired about any contact with private GI, writer spoke with clinic staff who have reached out to Dr. Pearson pending follow up conversation. Patient and mother made aware that writer will contact Dr. Pearson today. Patient inquired about when to return to work and was advised that there should be no issues with her returning to full work upon discharge.  Disposition: likely home after biopsy if LFTs continue to downtrend and no further inpatient intervention needed by GI [Well Groomed] : well groomed [General Appearance - Well Nourished] : well nourished [No Deformities] : no deformities [General Appearance - In No Acute Distress] : no acute distress [Normal Conjunctiva] : the conjunctiva exhibited no abnormalities [Eyelids - No Xanthelasma] : the eyelids demonstrated no xanthelasmas [Normal Oropharynx] : normal oropharynx [II] : II [Neck Appearance] : the appearance of the neck was normal [Neck Cervical Mass (___cm)] : no neck mass was observed [Jugular Venous Distention Increased] : there was no jugular-venous distention [Thyroid Diffuse Enlargement] : the thyroid was not enlarged [Thyroid Nodule] : there were no palpable thyroid nodules [Heart Rate And Rhythm] : heart rate and rhythm were normal [Heart Sounds] : normal S1 and S2 [Murmurs] : no murmurs present [Respiration, Rhythm And Depth] : normal respiratory rhythm and effort [Exaggerated Use Of Accessory Muscles For Inspiration] : no accessory muscle use [Abdomen Soft] : soft [Abdomen Tenderness] : non-tender [Abdomen Mass (___ Cm)] : no abdominal mass palpated [Gait - Sufficient For Exercise Testing] : the gait was sufficient for exercise testing [Nail Clubbing] : no clubbing of the fingernails [Cyanosis, Localized] : no localized cyanosis [Petechial Hemorrhages (___cm)] : no petechial hemorrhages [Skin Color & Pigmentation] : normal skin color and pigmentation [] : no rash [No Venous Stasis] : no venous stasis [Skin Lesions] : no skin lesions [No Skin Ulcers] : no skin ulcer [No Xanthoma] : no  xanthoma was observed [Deep Tendon Reflexes (DTR)] : deep tendon reflexes were 2+ and symmetric [Sensation] : the sensory exam was normal to light touch and pinprick [No Focal Deficits] : no focal deficits [Oriented To Time, Place, And Person] : oriented to person, place, and time [Impaired Insight] : insight and judgment were intact [Affect] : the affect was normal [FreeTextEntry1] : I:E ratio 1:3; mild expiratory wheezes

## 2020-06-22 ENCOUNTER — EMERGENCY (EMERGENCY)
Facility: HOSPITAL | Age: 55
LOS: 0 days | Discharge: HOME | End: 2020-06-23
Attending: EMERGENCY MEDICINE | Admitting: EMERGENCY MEDICINE
Payer: MEDICARE

## 2020-06-22 VITALS
TEMPERATURE: 97 F | RESPIRATION RATE: 18 BRPM | SYSTOLIC BLOOD PRESSURE: 136 MMHG | HEART RATE: 101 BPM | DIASTOLIC BLOOD PRESSURE: 76 MMHG | OXYGEN SATURATION: 100 % | WEIGHT: 182.98 LBS

## 2020-06-22 DIAGNOSIS — E86.0 DEHYDRATION: ICD-10-CM

## 2020-06-22 DIAGNOSIS — I10 ESSENTIAL (PRIMARY) HYPERTENSION: ICD-10-CM

## 2020-06-22 DIAGNOSIS — R11.0 NAUSEA: ICD-10-CM

## 2020-06-22 DIAGNOSIS — R53.83 OTHER FATIGUE: ICD-10-CM

## 2020-06-22 DIAGNOSIS — Z88.8 ALLERGY STATUS TO OTHER DRUGS, MEDICAMENTS AND BIOLOGICAL SUBSTANCES STATUS: ICD-10-CM

## 2020-06-22 DIAGNOSIS — E11.9 TYPE 2 DIABETES MELLITUS WITHOUT COMPLICATIONS: ICD-10-CM

## 2020-06-22 LAB
ALBUMIN SERPL ELPH-MCNC: 4.5 G/DL — SIGNIFICANT CHANGE UP (ref 3.5–5.2)
ALP SERPL-CCNC: 55 U/L — SIGNIFICANT CHANGE UP (ref 30–115)
ALT FLD-CCNC: 12 U/L — SIGNIFICANT CHANGE UP (ref 0–41)
ANION GAP SERPL CALC-SCNC: 18 MMOL/L — HIGH (ref 7–14)
APPEARANCE UR: CLEAR — SIGNIFICANT CHANGE UP
AST SERPL-CCNC: 35 U/L — SIGNIFICANT CHANGE UP (ref 0–41)
BACTERIA # UR AUTO: NEGATIVE — SIGNIFICANT CHANGE UP
BASE EXCESS BLDV CALC-SCNC: -1.3 MMOL/L — SIGNIFICANT CHANGE UP (ref -2–2)
BASOPHILS # BLD AUTO: 0.02 K/UL — SIGNIFICANT CHANGE UP (ref 0–0.2)
BASOPHILS NFR BLD AUTO: 0.5 % — SIGNIFICANT CHANGE UP (ref 0–1)
BILIRUB SERPL-MCNC: 0.6 MG/DL — SIGNIFICANT CHANGE UP (ref 0.2–1.2)
BILIRUB UR-MCNC: NEGATIVE — SIGNIFICANT CHANGE UP
BUN SERPL-MCNC: 18 MG/DL — SIGNIFICANT CHANGE UP (ref 10–20)
CA-I SERPL-SCNC: 1.21 MMOL/L — SIGNIFICANT CHANGE UP (ref 1.12–1.3)
CALCIUM SERPL-MCNC: 9.7 MG/DL — SIGNIFICANT CHANGE UP (ref 8.5–10.1)
CHLORIDE SERPL-SCNC: 93 MMOL/L — LOW (ref 98–110)
CO2 SERPL-SCNC: 22 MMOL/L — SIGNIFICANT CHANGE UP (ref 17–32)
COLOR SPEC: SIGNIFICANT CHANGE UP
CREAT SERPL-MCNC: 1.2 MG/DL — SIGNIFICANT CHANGE UP (ref 0.7–1.5)
DIFF PNL FLD: NEGATIVE — SIGNIFICANT CHANGE UP
EOSINOPHIL # BLD AUTO: 0.02 K/UL — SIGNIFICANT CHANGE UP (ref 0–0.7)
EOSINOPHIL NFR BLD AUTO: 0.5 % — SIGNIFICANT CHANGE UP (ref 0–8)
EPI CELLS # UR: 6 /HPF — HIGH (ref 0–5)
GAS PNL BLDV: 135 MMOL/L — LOW (ref 136–145)
GAS PNL BLDV: SIGNIFICANT CHANGE UP
GLUCOSE SERPL-MCNC: 87 MG/DL — SIGNIFICANT CHANGE UP (ref 70–99)
GLUCOSE UR QL: NEGATIVE — SIGNIFICANT CHANGE UP
HCO3 BLDV-SCNC: 25 MMOL/L — SIGNIFICANT CHANGE UP (ref 22–29)
HCT VFR BLD CALC: 40.8 % — SIGNIFICANT CHANGE UP (ref 37–47)
HCT VFR BLDA CALC: 41.1 % — SIGNIFICANT CHANGE UP (ref 34–44)
HGB BLD CALC-MCNC: 13.4 G/DL — LOW (ref 14–18)
HGB BLD-MCNC: 13.1 G/DL — SIGNIFICANT CHANGE UP (ref 12–16)
HYALINE CASTS # UR AUTO: 3 /LPF — SIGNIFICANT CHANGE UP (ref 0–7)
IMM GRANULOCYTES NFR BLD AUTO: 0.2 % — SIGNIFICANT CHANGE UP (ref 0.1–0.3)
KETONES UR-MCNC: ABNORMAL
LACTATE BLDV-MCNC: 2.3 MMOL/L — HIGH (ref 0.5–1.6)
LACTATE SERPL-SCNC: 1.7 MMOL/L — SIGNIFICANT CHANGE UP (ref 0.7–2)
LEUKOCYTE ESTERASE UR-ACNC: ABNORMAL
LIDOCAIN IGE QN: 24 U/L — SIGNIFICANT CHANGE UP (ref 7–60)
LYMPHOCYTES # BLD AUTO: 0.93 K/UL — LOW (ref 1.2–3.4)
LYMPHOCYTES # BLD AUTO: 21.8 % — SIGNIFICANT CHANGE UP (ref 20.5–51.1)
MCHC RBC-ENTMCNC: 24.1 PG — LOW (ref 27–31)
MCHC RBC-ENTMCNC: 32.1 G/DL — SIGNIFICANT CHANGE UP (ref 32–37)
MCV RBC AUTO: 75 FL — LOW (ref 81–99)
MONOCYTES # BLD AUTO: 0.51 K/UL — SIGNIFICANT CHANGE UP (ref 0.1–0.6)
MONOCYTES NFR BLD AUTO: 12 % — HIGH (ref 1.7–9.3)
NEUTROPHILS # BLD AUTO: 2.77 K/UL — SIGNIFICANT CHANGE UP (ref 1.4–6.5)
NEUTROPHILS NFR BLD AUTO: 65 % — SIGNIFICANT CHANGE UP (ref 42.2–75.2)
NITRITE UR-MCNC: NEGATIVE — SIGNIFICANT CHANGE UP
NRBC # BLD: 0 /100 WBCS — SIGNIFICANT CHANGE UP (ref 0–0)
PCO2 BLDV: 44 MMHG — SIGNIFICANT CHANGE UP (ref 41–51)
PH BLDV: 7.35 — SIGNIFICANT CHANGE UP (ref 7.26–7.43)
PH UR: 6 — SIGNIFICANT CHANGE UP (ref 5–8)
PLATELET # BLD AUTO: 320 K/UL — SIGNIFICANT CHANGE UP (ref 130–400)
PO2 BLDV: 36 MMHG — SIGNIFICANT CHANGE UP (ref 20–40)
POTASSIUM BLDV-SCNC: 3.7 MMOL/L — SIGNIFICANT CHANGE UP (ref 3.3–5.6)
POTASSIUM SERPL-MCNC: 3.9 MMOL/L — SIGNIFICANT CHANGE UP (ref 3.5–5)
POTASSIUM SERPL-SCNC: 3.9 MMOL/L — SIGNIFICANT CHANGE UP (ref 3.5–5)
PROT SERPL-MCNC: 7.4 G/DL — SIGNIFICANT CHANGE UP (ref 6–8)
PROT UR-MCNC: NEGATIVE — SIGNIFICANT CHANGE UP
RBC # BLD: 5.44 M/UL — HIGH (ref 4.2–5.4)
RBC # FLD: 18 % — HIGH (ref 11.5–14.5)
RBC CASTS # UR COMP ASSIST: 1 /HPF — SIGNIFICANT CHANGE UP (ref 0–4)
SAO2 % BLDV: 55 % — SIGNIFICANT CHANGE UP
SODIUM SERPL-SCNC: 133 MMOL/L — LOW (ref 135–146)
SP GR SPEC: 1.01 — SIGNIFICANT CHANGE UP (ref 1.01–1.02)
UROBILINOGEN FLD QL: SIGNIFICANT CHANGE UP
WBC # BLD: 4.26 K/UL — LOW (ref 4.8–10.8)
WBC # FLD AUTO: 4.26 K/UL — LOW (ref 4.8–10.8)
WBC UR QL: 12 /HPF — HIGH (ref 0–5)

## 2020-06-22 PROCEDURE — 99285 EMERGENCY DEPT VISIT HI MDM: CPT

## 2020-06-22 PROCEDURE — 71046 X-RAY EXAM CHEST 2 VIEWS: CPT | Mod: 26

## 2020-06-22 PROCEDURE — 70450 CT HEAD/BRAIN W/O DYE: CPT | Mod: 26

## 2020-06-22 PROCEDURE — 93010 ELECTROCARDIOGRAM REPORT: CPT

## 2020-06-22 RX ORDER — SODIUM CHLORIDE 9 MG/ML
1000 INJECTION, SOLUTION INTRAVENOUS ONCE
Refills: 0 | Status: COMPLETED | OUTPATIENT
Start: 2020-06-22 | End: 2020-06-22

## 2020-06-22 RX ADMIN — SODIUM CHLORIDE 1000 MILLILITER(S): 9 INJECTION, SOLUTION INTRAVENOUS at 21:00

## 2020-06-22 NOTE — ED ADULT TRIAGE NOTE - CHIEF COMPLAINT QUOTE
dizziness and nausea x 1 week. denies vomiting or abd pain. Pt was given Protonix and Zofran by her doctor, states nausea improved nut dizziness worsen. Pt Newly diagnosed with diabetes, didn't start on any med yet.

## 2020-06-23 VITALS
HEART RATE: 76 BPM | TEMPERATURE: 98 F | DIASTOLIC BLOOD PRESSURE: 79 MMHG | RESPIRATION RATE: 18 BRPM | OXYGEN SATURATION: 99 % | SYSTOLIC BLOOD PRESSURE: 116 MMHG

## 2020-06-23 PROBLEM — R73.03 PREDIABETES: Chronic | Status: ACTIVE | Noted: 2019-09-23

## 2020-06-23 PROBLEM — I10 ESSENTIAL (PRIMARY) HYPERTENSION: Chronic | Status: ACTIVE | Noted: 2019-09-23

## 2020-06-23 NOTE — ED PROVIDER NOTE - NSFOLLOWUPINSTRUCTIONS_ED_ALL_ED_FT
Dehydration, Adult    Dehydration is a condition in which you do not have enough fluid or water in your body. It happens when you take in less fluid than you lose. Vital organs such as the kidneys, brain, and heart cannot function without a proper amount of fluids. Any loss of fluids from the body can cause dehydration.     Dehydration can range from mild to severe. This condition should be treated right away to help prevent it from becoming severe.     CAUSES  This condition may be caused by:    Vomiting.  Diarrhea.  Excessive sweating, such as when exercising in hot or humid weather.  Not drinking enough fluid during strenuous exercise or during an illness.  Excessive urine output.  Fever.   Certain medicines.    RISK FACTORS  This condition is more likely to develop in:    People who are taking certain medicines that cause the body to lose excess fluid (diuretics).    People who have a chronic illness, such as diabetes, that may increase urination.   Older adults.    People who live at high altitudes.    People who participate in endurance sports.      SYMPTOMS  Mild Dehydration    Thirst.  Dry lips.  Slightly dry mouth.  Dry, warm skin.     Moderate Dehydration    Very dry mouth.    Muscle cramps.    Dark urine and decreased urine production.    Decreased tear production.    Headache.    Light-headedness, especially when you stand up from a sitting position.      Severe Dehydration    Changes in skin.    Cold and clammy skin.    Skin does not spring back quickly when lightly pinched and released.    Changes in body fluids.    Extreme thirst.    No tears.    Not able to sweat when body temperature is high, such as in hot weather.    Minimal urine production.    Changes in vital signs.    Rapid, weak pulse (more than 100 beats per minute when you are sitting still).    Rapid breathing.    Low blood pressure.    Other changes.    Sunken eyes.    Cold hands and feet.    Confusion.   Lethargy and difficulty being awakened.   Fainting (syncope).    Short-term weight loss.    Unconsciousness.     DIAGNOSIS  This condition may be diagnosed based on your symptoms. You may also have tests to determine how severe your dehydration is. These tests may include:     Urine tests.    Blood tests.      TREATMENT  Treatment for this condition depends on the severity. Mild or moderate dehydration can often be treated at home. Treatment should be started right away. Do not wait until dehydration becomes severe. Severe dehydration needs to be treated at the hospital.    Treatment for Mild Dehydration    Drinking plenty of water to replace the fluid you have lost.    Replacing minerals in your blood (electrolytes) that you may have lost.      Treatment for Moderate Dehydration     Consuming oral rehydration solution (ORS).     Treatment for Severe Dehydration    Receiving fluid through an IV tube.    Receiving electrolyte solution through a feeding tube that is passed through your nose and into your stomach (nasogastric tube or NG tube).  Correcting any abnormalities in electrolytes.     HOME CARE INSTRUCTIONS  Drink enough fluid to keep your urine clear or pale yellow.    Drink water or fluid slowly by taking small sips. You can also try sucking on ice cubes.   Have food or beverages that contain electrolytes. Examples include bananas and sports drinks.  Take over-the-counter and prescription medicines only as told by your health care provider.    Prepare ORS according to the 's instructions. Take sips of ORS every 5 minutes until your urine returns to normal.  If you have vomiting or diarrhea, continue to try to drink water, ORS, or both.    If you have diarrhea, avoid:    Beverages that contain caffeine.    Fruit juice.    Milk.     Carbonated soft drinks.  Do not take salt tablets. This can lead to the condition of having too much sodium in your body (hypernatremia).      SEEK MEDICAL CARE IF:  You cannot eat or drink without vomiting.   You have had moderate diarrhea during a period of more than 24 hours.   You have a fever.    SEEK IMMEDIATE MEDICAL CARE IF:  You have extreme thirst.  You have severe diarrhea.   You have not urinated in 6–8 hours, or you have urinated only a small amount of very dark urine.  You have shriveled skin.  You are dizzy, confused, or both.    ADDITIONAL NOTES AND INSTRUCTIONS    Please follow up with your Primary MD in 24-48 hr.  Seek immediate medical care for any new/worsening signs or symptoms.       Lightheadedness    AMBULATORY CARE:    Lightheadedness is the feeling that you may faint, but you do not. Your heartbeat may be fast or feel like it flutters. Lightheadedness may occur when you take certain medicines, such as medicine to lower your blood pressure. Dehydration, low sodium, low blood sugar, an abnormal heart rhythm, and anxiety are other common causes.     Seek care immediately if:     You have sudden chest pain.       You have trouble breathing or shortness of breath.       You have vision changes, are sweating, and have nausea while you are sitting or lying down.       You feel flushed and your heart is fluttering.      You pass out.     Contact your healthcare provider if:     You feel lightheaded often.       Your heart beats faster or slower than usual.       You have questions or concerns about your condition or care.    Manage your symptoms: Talk with your healthcare provider about these and other ways to manage your symptoms:    Lie down when you feel lightheaded, your throat gets tight, or your vision changes. Raise your legs above the level of your heart.      Stand up slowly. Sit on the side of the bed or couch for a few minutes before you stand up.       Take slow, deep breaths when you feel lightheaded. This can help decrease the feeling that you might faint.       Ask if you need to avoid hot baths and saunas. These may make your symptoms worse.     Watch for signs of low blood sugar: These include hunger, nervousness, sweating, and fast or fluttery heartbeats. Talk with your healthcare provider about ways to keep your blood sugar level steady.    Check your blood pressure often: You should do this especially if you take medicine to lower your blood pressure. Check your blood pressure when you are lying down and when you are standing. Ask how often to check during the day. Keep a record of your blood pressure numbers. Your healthcare provider may use the record to help plan your treatment.    Keep a record of your lightheadedness episodes: Include your symptoms and your activity before and after the episode. The record can help your healthcare provider find the cause of your lightheadedness and help you manage episodes.    Follow up with your healthcare provider as directed: You may need more tests to help find the cause of your lightheadedness. The tests will help healthcare providers plan the best treatment for you. Write down your questions so you remember to ask them during your visits.    Nausea / Vomiting    Nausea is the feeling that you have an upset stomach or have to vomit. As nausea gets worse, it can lead to vomiting. Vomiting occurs when stomach contents are thrown up and out of the mouth which puts you at an increased risk for dehydration. Older adults and people with other diseases or a weak immune system are at higher risk for dehydration. Drink clear fluids in small but frequent amounts as tolerated. Eat bland, easy-to-digest foods in small amounts as tolerated.     SEEK IMMEDIATE MEDICAL CARE IF YOU HAVE THE FOLLOWING SYMPTOMS: fever, inability to keep fluids down, black or bloody vomitus, black or bloody stools, lightheadedness/dizziness, chest pain, severe headache, rash, shortness of breath, cold or clammy skin, confusion, pain with urination, or any signs of dehydration.

## 2020-06-23 NOTE — ED PROVIDER NOTE - CARE PLAN
Principal Discharge DX:	Nausea  Secondary Diagnosis:	Lightheadedness  Secondary Diagnosis:	Dehydration

## 2020-06-23 NOTE — ED PROVIDER NOTE - PHYSICAL EXAMINATION
VITAL SIGNS: I have reviewed nursing notes and confirm.  CONSTITUTIONAL: Well-developed; well-nourished; in no acute distress.   SKIN:  skin exam is warm and dry, no acute rash.    HEAD: Normocephalic; atraumatic.  EYES:  conjunctiva and sclera clear.  ENT: No nasal discharge; airway clear.  CARD: S1, S2 normal; no murmurs, gallops, or rubs. Regular rate and rhythm.   RESP: No wheezes, rales or rhonchi.  ABD: Normal bowel sounds; soft; non-distended; non-tender  EXT: Normal ROM.  No clubbing, cyanosis or edema.   NEURO: muscle strength 5/5 throughout both flexor/extensor mechanism intact, sensation intact, ambulating well Alert, oriented, grossly unremarkable

## 2020-06-23 NOTE — ED PROVIDER NOTE - CLINICAL SUMMARY MEDICAL DECISION MAKING FREE TEXT BOX
56 yo female with pmh of autoimmune hepatitis, DM (new onset HAc 7.7), HTN (likely both related to chronic prednisone therapy, which she just dcd on first week of June) presents to the ER for feeling nauseous since last week and 2 days of feeling lightheaded. Denies any vomiting/diarrhea/HA/neck pain/fever/chills/cough/CP/SOB/abdomen pain/numbness/unilateral weakness/incontinence/rash/sore throat/nasal congestion/sinus pain/leg swelling/calf tenderness. Admits to mild fatigue (Still able to go out for walks however) and decreased appetite compared to usual, and mild constipation (no BM today but +flatus). Denies  or GYN complaints. Denies changes to skin or sleep patterns. No sweats. On exam NCAT, PERRL, EOMI, no ptosis, no scleral icterus, no neck pain/tenderness/no mastoid tenderness, Lungs CTAB, Heart reg s1s2, Abdomen soft nt/nd +BS, no masses, Ext no edema or calf tenderness, Neuro intact, normal neuro-motor exam, no slurred speech, no ataxia. Currently no nausea, states the zofran she takes at home helps her with her nausea, but after she took it over weekend, she felt lightheaded. Reviewed test results. Doesn't seem to meet criteria for adrenal crisis, just mild dehydration. CT head result reviewed but pt has no mastoid tenderness, no ear complaints of any kind, no sinus complaints therefore do not believe she has acute mastoiditis causing these complaints. Recommend follow up with Endo as possibly related to withdrawal from chronic steriod use, and to monitor her HA1C levels of 7.7 (likely all related to her steroid use, as serum glucose is good). Return to ER for any worsening.

## 2020-06-23 NOTE — ED PROVIDER NOTE - PATIENT PORTAL LINK FT
You can access the FollowMyHealth Patient Portal offered by [facility] by registering at the following website: [email or web address]. By joining [employer]’s FollowMyHealth portal, you will also be able to view your health information using other applications (apps) compatible with our system.

## 2020-06-23 NOTE — ED PROVIDER NOTE - OBJECTIVE STATEMENT
Pt is a 56y/o female with a pmhx of autoimmune hepatitis, borderline DM presents today for eval of intermittent nausea x 1 week with associated lightheadedness x 2 days with no aggravating/alleviating factors. Pt denies fever, chills, weakness, numbness, CP, SOB, Led swelling.

## 2020-06-23 NOTE — ED PROVIDER NOTE - CARE PROVIDER_API CALL
Abel Carvlaho  ENDOCRINOLOGY/METAB/DIABETES  [address] [place] [address]  Phone: [phone]  Fax: [phone]  Follow Up Time: 4-6 Days

## 2020-06-23 NOTE — ED PROVIDER NOTE - ATTENDING CONTRIBUTION TO CARE
56 yo female with pmh of autoimmune hepatitis, DM (new onset HAc 7.7), HTN (likely both related to chronic prednisone therapy, which she just dcd on first week of June) presents to the ER for feeling nauseous since last week and 2 days of feeling lightheaded. Denies any vomiting/diarrhea/HA/neck pain/fever/chills/cough/CP/SOB/abdomen pain/numbness/unilateral weakness/incontinence/rash/sore throat/nasal congestion/sinus pain/leg swelling/calf tenderness. Admits to mild fatigue (Still able to go out for walks however) and decreased appetite compared to usual, and mild constipation (no BM today but +flatus). Denies  or GYN complaints. Denies changes to skin or sleep patterns. No sweats. On exam NCAT, PERRL, EOMI, no ptosis, no scleral icterus, no neck pain/tenderness/no mastoid tenderness, Lungs CTAB, Heart reg s1s2, Abdomen soft nt/nd +BS, no masses, Ext no edema or calf tenderness, Neuro intact, normal neuro-motor exam, no slurred speech, no ataxia. Ordered labs, ekg, xray and CT head to assess for neuro mediated nausea/dizziness as she has no abdomen tenderness, to give IVFs. Currently no nausea, states the zofran she takes at home helps her with her nausea, but after she took it over weekend, she felt lightheaded.     ALL: nkda  PMH as above  Meds: zofran 4 mg, prednisone for 8 months (just stopped first week of June), Azathioprine, Benicar 12.5mg, Amlodipine 2.5 mg, omeprazole  SH: no smoking or etoh  PMD Michel (at [facility]), Liver specialist Dr Butcher in [facility])

## 2020-06-23 NOTE — ED PROVIDER NOTE - NS ED ROS FT
Eyes:  No visual changes, eye pain or discharge.  ENMT:  No hearing changes, pain, discharge or infections. No neck pain or stiffness.  Cardiac:  No chest pain, SOB or edema  Respiratory:  No cough or respiratory distress. No hemoptysis. No history of asthma or RAD.  GI:  + nausea, No vomiting, diarrhea or abdominal pain.  MS:  No myalgia, muscle weakness, joint pain or back pain.  Neuro:  No headache or weakness.  No LOC.  Skin:  No skin rash.   Endocrine: No history of thyroid disease or diabetes.  Except as documented in the HPI,  all other systems are negative.

## 2020-10-17 NOTE — ED ADULT TRIAGE NOTE - NS ED TRIAGE AVPU SCALE
Alert-The patient is alert, awake and responds to voice. The patient is oriented to time, place, and person. The triage nurse is able to obtain subjective information.
2 seconds or less

## 2020-12-16 ENCOUNTER — RESULT REVIEW (OUTPATIENT)
Age: 55
End: 2020-12-16

## 2021-11-11 ENCOUNTER — APPOINTMENT (OUTPATIENT)
Dept: NEUROLOGY | Facility: CLINIC | Age: 56
End: 2021-11-11

## 2022-06-01 NOTE — PATIENT PROFILE ADULT - FALL HARM RISK CONCLUSION
Date:June 2, 2022      Provider requested that no letter be sent. Do not send.       [facility]       Universal Safety Interventions

## 2022-08-19 ENCOUNTER — NON-APPOINTMENT (OUTPATIENT)
Age: 57
End: 2022-08-19

## 2022-08-22 ENCOUNTER — APPOINTMENT (OUTPATIENT)
Dept: CARDIOLOGY | Facility: CLINIC | Age: 57
End: 2022-08-22

## 2022-08-22 VITALS
SYSTOLIC BLOOD PRESSURE: 156 MMHG | HEART RATE: 70 BPM | TEMPERATURE: 97.6 F | HEIGHT: 63 IN | OXYGEN SATURATION: 98 % | BODY MASS INDEX: 25.87 KG/M2 | DIASTOLIC BLOOD PRESSURE: 100 MMHG | RESPIRATION RATE: 14 BRPM | WEIGHT: 146 LBS

## 2022-08-22 DIAGNOSIS — Z78.9 OTHER SPECIFIED HEALTH STATUS: ICD-10-CM

## 2022-08-22 DIAGNOSIS — K75.4 AUTOIMMUNE HEPATITIS: ICD-10-CM

## 2022-08-22 PROCEDURE — 99204 OFFICE O/P NEW MOD 45 MIN: CPT | Mod: 25

## 2022-08-22 PROCEDURE — 93000 ELECTROCARDIOGRAM COMPLETE: CPT

## 2022-08-22 NOTE — HISTORY OF PRESENT ILLNESS
[FreeTextEntry1] : Ms. Freitas is a 57-year-old woman with history of pre-diabetes, hyperlipidemia, and autoimmun hepatitis presenting for evaluation of syncope.\par \par At baseline, patient reports being physically active at work, walking without restrictions and able to fulfill all of her duties without cardiac symptoms. She denies chest pain, dyspnea, palpitations, LE swelling, PND, or orthopnea.\par \par She does report two episodes of syncope, one a few months ago after eating something containing CBD and one recently after attending a . \par \par At present, she feels well and denies active complaints.\par \par , HDL 82, TG 34, , Non-, A1c 5.7% in

## 2022-08-22 NOTE — ASSESSMENT
[FreeTextEntry1] : Ms. Freitas is a 57-year-old woman with history of pre-diabetes, hypertension, hyperlipidemia, and autoimmune hepatitis presenting for evaluation of syncope.\par \par Impression:\par (1) Syncope, likely situational but cannot exclude cardiac etiology given recurrence. Requires further evaluation.\par (2) Hyperlipidemia, ASCVD 4.3%\par (3) HTN, recently had her amlodipine decreased to 5mg; repeat BP by me: 145/90. \par (4) Pre-DM2\par (5) Autoimmune hepatitis on azathioprine\par \par Plan:\par - Will recommend increasing amlpodipine back to 10mg and following up with PCP. \par - Continue losartan 50mg daily\par - TTE to evaluate for structural heart disease\par - Primordial and primary ASCVD risk prevention was discussed. Patient was informed of the "Essential 8" guidelines for health including: (1) physical activity (>150min moderate or >75min vigorous exercise per week); (2) diet [I recommend a pesco-Mediterranean diet with ~16 hours of daily fasting]; (3) adequate/good quality sleep; (4) weight loss (BMI <25); (5) avoidance/cessation of smoking; (6) cholesterol (LDL as low as possible; TChol <200); (7) BP control (<120/80); (8) glucose control (fasting BG <100). Source: https://www.ahajournals.org/doi/epdf/10.1161/CIR.4403519620622037\par \par RTC after TTE

## 2022-08-24 ENCOUNTER — APPOINTMENT (OUTPATIENT)
Dept: CARDIOLOGY | Facility: CLINIC | Age: 57
End: 2022-08-24

## 2022-08-31 ENCOUNTER — APPOINTMENT (OUTPATIENT)
Dept: CARDIOLOGY | Facility: CLINIC | Age: 57
End: 2022-08-31

## 2022-08-31 PROCEDURE — 93306 TTE W/DOPPLER COMPLETE: CPT

## 2022-10-04 ENCOUNTER — APPOINTMENT (OUTPATIENT)
Dept: CARDIOLOGY | Facility: CLINIC | Age: 57
End: 2022-10-04

## 2022-10-04 VITALS
SYSTOLIC BLOOD PRESSURE: 146 MMHG | TEMPERATURE: 98.1 F | WEIGHT: 149 LBS | HEART RATE: 70 BPM | BODY MASS INDEX: 26.4 KG/M2 | OXYGEN SATURATION: 96 % | HEIGHT: 63 IN | DIASTOLIC BLOOD PRESSURE: 90 MMHG

## 2022-10-04 DIAGNOSIS — R55 SYNCOPE AND COLLAPSE: ICD-10-CM

## 2022-10-04 PROCEDURE — 93000 ELECTROCARDIOGRAM COMPLETE: CPT | Mod: 59

## 2022-10-04 PROCEDURE — 99213 OFFICE O/P EST LOW 20 MIN: CPT | Mod: 25

## 2022-10-04 PROCEDURE — 93246 EXT ECG>7D<15D RECORDING: CPT

## 2022-10-04 NOTE — HISTORY OF PRESENT ILLNESS
[FreeTextEntry1] : Ms. Freitas is a 57-year-old woman with history of pre-diabetes, hyperlipidemia, and autoimmune hepatitis presenting for evaluation of syncope.\par \par At baseline, patient reports being physically active at work, walking without restrictions and able to fulfill all of her duties without cardiac symptoms. She denies chest pain, dyspnea, palpitations, LE swelling, PND, or orthopnea.\par \par She does report two episodes of syncope, one a few months ago after eating something containing CBD and one recently after attending a . \par \par , HDL 82, TG 34, , Non-, A1c 5.7% in \par \par Today, reports intermittent episodes of palpitations and lightheadedness. No further episodes of syncope.

## 2022-10-04 NOTE — ASSESSMENT
[FreeTextEntry1] : Ms. Freitas is a 57-year-old woman with history of pre-diabetes, hypertension, hyperlipidemia, and autoimmune hepatitis presenting for evaluation of syncope.\par \par Impression:\par (1) Syncope, likely situational but cannot exclude cardiac etiology given recurrence. Requires further evaluation.\par (2) Hyperlipidemia, ASCVD 4.3%\par (3) HTN, recently had her amlodipine decreased to 5mg; repeat BP by me: 145/90. \par (4) Pre-DM2\par (5) Autoimmune hepatitis on azathioprine\par \par Plan:\par - 7 day event monitor placed in office.\par - Continue amlodipine 5mg daily, low threshold to increase to 10mg. Patient was provided with instructions on home BP monitoring.\par - Continue losartan 50mg daily\par - Primordial and primary ASCVD risk prevention was discussed on a prior visit. Patient was informed of the "Essential 8" guidelines for health including: (1) physical activity (>150min moderate or >75min vigorous exercise per week); (2) diet [I recommend a pesco-Mediterranean diet with ~16 hours of daily fasting]; (3) adequate/good quality sleep; (4) weight loss (BMI <25); (5) avoidance/cessation of smoking; (6) cholesterol (LDL as low as possible; TChol <200); (7) BP control (<120/80); (8) glucose control (fasting BG <100). Source: https://www.ahajournals.org/doi/epdf/10.1161/CIR.5286272629558202\par \par RTC after 7 day monitor

## 2022-10-04 NOTE — PHYSICAL EXAM
[Well Developed] : well developed [Well Nourished] : well nourished [No Acute Distress] : no acute distress [Normal Conjunctiva] : normal conjunctiva [No Respiratory Distress] : no respiratory distress  [Normal Gait] : normal gait [No Edema] : no edema [No Cyanosis] : no cyanosis [No Clubbing] : no clubbing [No Varicosities] : no varicosities [No Rash] : no rash [No Skin Lesions] : no skin lesions [Moves all extremities] : moves all extremities [No Focal Deficits] : no focal deficits [Normal Speech] : normal speech [Alert and Oriented] : alert and oriented [Normal memory] : normal memory

## 2022-10-04 NOTE — ED PROVIDER NOTE - CARE PLAN
RE: Plan of Care    Dear Dr. Nita Villafuerte MD    Thank you for referring Kirsten Lopez. The following information reflects my assessment and plan of care.           Plan of Care 10/04/22   Effective from: 10/4/2022  Effective to: 2022    Plan ID: [id number]            Participants     Name Type Comments Contact Info    Nita Villafuerte MD Referring Provider      Molly Todd, OT Occupational Therapist             Kirsten Lopez MRN:[id number] (:1968 54 year old F)             Evaluation     Author: Molly Todd OT Status: Signed Last edited: 10/4/2022 11:30 AM       Occupational Therapy Daily Treatment    Visit Count: 14    Precautions: history of cellulitis    SUBJECTIVE   The patient shares her pump trial went well and that she measured smaller after using the pump. She reports that she was told she should expect the pump within 2 weeks or so.  She reports her right leg feels more swollen still. She shares she is no longer feeling ill.  She denies heaviness this date.   Current Pain (0-10 scale): 0  Functional Change: Wearing compression for up to 8 hours over the weekend.    OBJECTIVE   22 = most recent measures    Treatment   Manual Lymphatic Drainage / Manual Therapy:  Proximal clearing and re-routing focus on lateral trunk to facilitate lymphatic flow and drainage of RLE  Pump points  Muscle bending to calf  Applied eucerin to RLE    Therapeutic Activity: to promote lymphatic drainage   Reviewed HEP with patient.     Therapeutic Exercise: to promote lymphatic drainage  Diaphragmatic breathing   Active ROM between pathways: shoulder shrugs, leg raises, heel slides, ankle pumps, toe curls    Compression:  Multiple layer compression wrapping: - HELD circaid juxtalite this date as patient forgot at home. She reports she is going straight home after her OT appointment today and will elma as soon as she gets home.  Layer 1: CD tensoshape with relief cuts  Layer 2: Circaid  juxtalite      Skilled input: as detailed above    Home Program:   Diaphragmatic breathing and tummy rubs  Written instructions provided:   For Your Well [facility] ([id number]): Lower Extremity Lymphedema Exercises / node stimulation  Compression garment    Writer verbally educated the patient and received verbal consent from the patient on hand placement, positioning of patient, and techniques to be performed today including clothing adjustments for techniques, therapist position for techniques, hand placement and palpation for techniques as described above and how they are pertinent to the patient's plan of care.       Suggestions for next session as indicated: progress per plan of care    ASSESSMENT   The patient demonstrates increased firmness and pitting edema anterior shin. She demonstrates a good response to manual treatment with visible improvements in girth anterior shin. Continue with plan of care.    Continue with skilled OT 1x/week x 4 weeks with tapering as patient progresses. Anticipate transitioning to discharge once patient receives home vasopneumatic pump.    Pain after treatment (patient reported, 0-10 scale): 0  Result of above outlined education: Verbalizes understanding and Demonstrates understanding    Therapy procedure time and total treatment time can be found documented on the Time Entry flowsheet         Updated Participants     Name Type Comments Contact Info    Nita Villafuerte MD Referring Provider      Signature pending    Molly Todd OT Occupational Therapist      Signature pending            Please complete the attached form to indicate your approval of the plan of care upon receipt and fax signed form to the fax number below.  Insurance compliance requires your approval be on file.  Should you have any questions, feel free to contact me.     Molly Todd OT  [facility]  [address] [place] [address]  Phone: [phone]                RE:  Plan of Care for Kirsten Lopez, YOB: 1968     I certify the need for these services, furnished under this plan of treatment and while under my care.  I agree with the plan of care as stated and request that therapy proceed.        __________________________________________________________________________________  MD Signature         Date   Time Principal Discharge DX:	Hemorrhoids

## 2022-11-14 ENCOUNTER — APPOINTMENT (OUTPATIENT)
Dept: CARDIOLOGY | Facility: CLINIC | Age: 57
End: 2022-11-14

## 2022-11-14 VITALS
BODY MASS INDEX: 26.22 KG/M2 | SYSTOLIC BLOOD PRESSURE: 134 MMHG | HEIGHT: 63 IN | OXYGEN SATURATION: 97 % | HEART RATE: 81 BPM | WEIGHT: 148 LBS | DIASTOLIC BLOOD PRESSURE: 86 MMHG

## 2022-11-14 PROCEDURE — 99213 OFFICE O/P EST LOW 20 MIN: CPT | Mod: 25

## 2022-11-14 PROCEDURE — 93000 ELECTROCARDIOGRAM COMPLETE: CPT

## 2022-11-14 RX ORDER — LOSARTAN POTASSIUM 50 MG/1
50 TABLET, FILM COATED ORAL DAILY
Refills: 0 | Status: DISCONTINUED | COMMUNITY
End: 2022-11-14

## 2022-11-14 NOTE — HISTORY OF PRESENT ILLNESS
[FreeTextEntry1] : Ms. Freitas is a 57-year-old woman with history of pre-diabetes, hyperlipidemia, and autoimmune hepatitis presenting for evaluation of syncope.\par \par At baseline, patient reports being physically active at work, walking without restrictions and able to fulfill all of her duties without cardiac symptoms. She denies chest pain, dyspnea, palpitations, LE swelling, PND, or orthopnea.\par \par She does report two episodes of syncope, one a few months ago after eating something containing CBD and one recently after attending a . \par \par , HDL 82, TG 34, , Non-, A1c 5.7% in \par \par Follow up: reports intermittent episodes of palpitations and lightheadedness. No further episodes of syncope.\par \par 14 Day event monitor 10/2022\par - NSR, no AF, no pauses, no PVCs, <0.01% SVE, 2 events -> artifact + NSR\par \par Today, feels well, denies active cardiopulmonary complaints.

## 2022-11-14 NOTE — ASSESSMENT
[FreeTextEntry1] : Ms. Freitas is a 57-year-old woman with history of pre-diabetes, hypertension, hyperlipidemia, and autoimmune hepatitis presenting for evaluation of syncope.\par \par Impression:\par (1) Syncope, likely situational/vasovagal, 14 day event monitor unremarkable.\par (2) Hyperlipidemia, ASCVD 4.3%\par (3) HTN, recently had her amlodipine decreased to 5mg, improved\par (4) Pre-DM2\par (5) Autoimmune hepatitis on azathioprine\par \par Plan:\par - Continue amlodipine 5mg daily\par - Continue losartan 50mg daily\par - Primordial and primary ASCVD risk prevention was discussed on a prior visit. Patient was informed of the "Essential 8" guidelines for health including: (1) physical activity (>150min moderate or >75min vigorous exercise per week); (2) diet [I recommend a pesco-Mediterranean diet with ~16 hours of daily fasting]; (3) adequate/good quality sleep; (4) weight loss (BMI <25); (5) avoidance/cessation of smoking; (6) cholesterol (LDL as low as possible; TChol <200); (7) BP control (<120/80); (8) glucose control (fasting BG <100). Source: https://www.ahajournals.org/doi/epdf/10.1161/CIR.5842050379195362\par \par Annual follow up, sooner as needed

## 2023-11-13 ENCOUNTER — APPOINTMENT (OUTPATIENT)
Dept: CARDIOLOGY | Facility: CLINIC | Age: 58
End: 2023-11-13
Payer: COMMERCIAL

## 2023-11-13 VITALS
HEIGHT: 63 IN | SYSTOLIC BLOOD PRESSURE: 130 MMHG | DIASTOLIC BLOOD PRESSURE: 88 MMHG | BODY MASS INDEX: 32.43 KG/M2 | HEART RATE: 66 BPM | WEIGHT: 183 LBS

## 2023-11-13 DIAGNOSIS — R73.03 PREDIABETES.: ICD-10-CM

## 2023-11-13 DIAGNOSIS — E78.5 HYPERLIPIDEMIA, UNSPECIFIED: ICD-10-CM

## 2023-11-13 PROCEDURE — 93000 ELECTROCARDIOGRAM COMPLETE: CPT

## 2023-11-13 PROCEDURE — 99214 OFFICE O/P EST MOD 30 MIN: CPT | Mod: 25

## 2023-11-13 RX ORDER — AZATHIOPRINE 50 1/1
50 TABLET ORAL DAILY
Refills: 0 | Status: DISCONTINUED | COMMUNITY
End: 2023-11-13

## 2023-11-13 RX ORDER — AMLODIPINE BESYLATE 10 MG/1
10 TABLET ORAL DAILY
Refills: 0 | Status: ACTIVE | COMMUNITY

## 2023-11-13 RX ORDER — LOSARTAN POTASSIUM AND HYDROCHLOROTHIAZIDE 12.5; 5 MG/1; MG/1
50-12.5 TABLET ORAL DAILY
Refills: 0 | Status: ACTIVE | COMMUNITY
Start: 2022-08-31

## 2024-06-03 ENCOUNTER — EMERGENCY (EMERGENCY)
Facility: HOSPITAL | Age: 59
LOS: 0 days | Discharge: HOME | End: 2024-06-03
Attending: STUDENT IN AN ORGANIZED HEALTH CARE EDUCATION/TRAINING PROGRAM
Payer: COMMERCIAL

## 2024-06-03 VITALS
TEMPERATURE: 98 F | RESPIRATION RATE: 19 BRPM | DIASTOLIC BLOOD PRESSURE: 81 MMHG | WEIGHT: 188.94 LBS | HEART RATE: 73 BPM | OXYGEN SATURATION: 99 % | SYSTOLIC BLOOD PRESSURE: 131 MMHG

## 2024-06-03 VITALS
TEMPERATURE: 98 F | HEART RATE: 78 BPM | SYSTOLIC BLOOD PRESSURE: 132 MMHG | RESPIRATION RATE: 18 BRPM | DIASTOLIC BLOOD PRESSURE: 75 MMHG | OXYGEN SATURATION: 98 %

## 2024-06-03 DIAGNOSIS — R07.2 PRECORDIAL PAIN: ICD-10-CM

## 2024-06-03 DIAGNOSIS — E78.5 HYPERLIPIDEMIA, UNSPECIFIED: ICD-10-CM

## 2024-06-03 DIAGNOSIS — Z88.1 ALLERGY STATUS TO OTHER ANTIBIOTIC AGENTS STATUS: ICD-10-CM

## 2024-06-03 DIAGNOSIS — K75.4 AUTOIMMUNE HEPATITIS: ICD-10-CM

## 2024-06-03 DIAGNOSIS — I10 ESSENTIAL (PRIMARY) HYPERTENSION: ICD-10-CM

## 2024-06-03 DIAGNOSIS — R73.03 PREDIABETES: ICD-10-CM

## 2024-06-03 LAB
ALBUMIN SERPL ELPH-MCNC: 4.3 G/DL — SIGNIFICANT CHANGE UP (ref 3.5–5.2)
ALP SERPL-CCNC: 64 U/L — SIGNIFICANT CHANGE UP (ref 30–115)
ALT FLD-CCNC: 9 U/L — SIGNIFICANT CHANGE UP (ref 0–41)
ANION GAP SERPL CALC-SCNC: 11 MMOL/L — SIGNIFICANT CHANGE UP (ref 7–14)
AST SERPL-CCNC: 27 U/L — SIGNIFICANT CHANGE UP (ref 0–41)
BASOPHILS # BLD AUTO: 0.06 K/UL — SIGNIFICANT CHANGE UP (ref 0–0.2)
BASOPHILS NFR BLD AUTO: 1.1 % — HIGH (ref 0–1)
BILIRUB SERPL-MCNC: 0.5 MG/DL — SIGNIFICANT CHANGE UP (ref 0.2–1.2)
BUN SERPL-MCNC: 17 MG/DL — SIGNIFICANT CHANGE UP (ref 10–20)
CALCIUM SERPL-MCNC: 9.6 MG/DL — SIGNIFICANT CHANGE UP (ref 8.4–10.5)
CHLORIDE SERPL-SCNC: 104 MMOL/L — SIGNIFICANT CHANGE UP (ref 98–110)
CO2 SERPL-SCNC: 24 MMOL/L — SIGNIFICANT CHANGE UP (ref 17–32)
CREAT SERPL-MCNC: 0.8 MG/DL — SIGNIFICANT CHANGE UP (ref 0.7–1.5)
EGFR: 84 ML/MIN/1.73M2 — SIGNIFICANT CHANGE UP
EOSINOPHIL # BLD AUTO: 0.07 K/UL — SIGNIFICANT CHANGE UP (ref 0–0.7)
EOSINOPHIL NFR BLD AUTO: 1.3 % — SIGNIFICANT CHANGE UP (ref 0–8)
GLUCOSE SERPL-MCNC: 107 MG/DL — HIGH (ref 70–99)
HCT VFR BLD CALC: 42.1 % — SIGNIFICANT CHANGE UP (ref 37–47)
HGB BLD-MCNC: 13.5 G/DL — SIGNIFICANT CHANGE UP (ref 12–16)
IMM GRANULOCYTES NFR BLD AUTO: 0.2 % — SIGNIFICANT CHANGE UP (ref 0.1–0.3)
LYMPHOCYTES # BLD AUTO: 1.55 K/UL — SIGNIFICANT CHANGE UP (ref 1.2–3.4)
LYMPHOCYTES # BLD AUTO: 27.8 % — SIGNIFICANT CHANGE UP (ref 20.5–51.1)
MAGNESIUM SERPL-MCNC: 2.5 MG/DL — HIGH (ref 1.8–2.4)
MCHC RBC-ENTMCNC: 27.8 PG — SIGNIFICANT CHANGE UP (ref 27–31)
MCHC RBC-ENTMCNC: 32.1 G/DL — SIGNIFICANT CHANGE UP (ref 32–37)
MCV RBC AUTO: 86.8 FL — SIGNIFICANT CHANGE UP (ref 81–99)
MONOCYTES # BLD AUTO: 0.41 K/UL — SIGNIFICANT CHANGE UP (ref 0.1–0.6)
MONOCYTES NFR BLD AUTO: 7.3 % — SIGNIFICANT CHANGE UP (ref 1.7–9.3)
NEUTROPHILS # BLD AUTO: 3.48 K/UL — SIGNIFICANT CHANGE UP (ref 1.4–6.5)
NEUTROPHILS NFR BLD AUTO: 62.3 % — SIGNIFICANT CHANGE UP (ref 42.2–75.2)
NRBC # BLD: 0 /100 WBCS — SIGNIFICANT CHANGE UP (ref 0–0)
PLATELET # BLD AUTO: 204 K/UL — SIGNIFICANT CHANGE UP (ref 130–400)
PMV BLD: 11.2 FL — HIGH (ref 7.4–10.4)
POTASSIUM SERPL-MCNC: 4.8 MMOL/L — SIGNIFICANT CHANGE UP (ref 3.5–5)
POTASSIUM SERPL-SCNC: 4.8 MMOL/L — SIGNIFICANT CHANGE UP (ref 3.5–5)
PROT SERPL-MCNC: 7.5 G/DL — SIGNIFICANT CHANGE UP (ref 6–8)
RBC # BLD: 4.85 M/UL — SIGNIFICANT CHANGE UP (ref 4.2–5.4)
RBC # FLD: 14.4 % — SIGNIFICANT CHANGE UP (ref 11.5–14.5)
SODIUM SERPL-SCNC: 139 MMOL/L — SIGNIFICANT CHANGE UP (ref 135–146)
TROPONIN T, HIGH SENSITIVITY RESULT: <6 NG/L — SIGNIFICANT CHANGE UP (ref 6–13)
TROPONIN T, HIGH SENSITIVITY RESULT: <6 NG/L — SIGNIFICANT CHANGE UP (ref 6–13)
WBC # BLD: 5.58 K/UL — SIGNIFICANT CHANGE UP (ref 4.8–10.8)
WBC # FLD AUTO: 5.58 K/UL — SIGNIFICANT CHANGE UP (ref 4.8–10.8)

## 2024-06-03 PROCEDURE — 75574 CT ANGIO HRT W/3D IMAGE: CPT | Mod: MC

## 2024-06-03 PROCEDURE — 85025 COMPLETE CBC W/AUTO DIFF WBC: CPT

## 2024-06-03 PROCEDURE — G0378: CPT

## 2024-06-03 PROCEDURE — 99285 EMERGENCY DEPT VISIT HI MDM: CPT | Mod: 25

## 2024-06-03 PROCEDURE — 75574 CT ANGIO HRT W/3D IMAGE: CPT | Mod: 26,MC

## 2024-06-03 PROCEDURE — 71045 X-RAY EXAM CHEST 1 VIEW: CPT | Mod: 26

## 2024-06-03 PROCEDURE — 84484 ASSAY OF TROPONIN QUANT: CPT

## 2024-06-03 PROCEDURE — 93005 ELECTROCARDIOGRAM TRACING: CPT

## 2024-06-03 PROCEDURE — 96374 THER/PROPH/DIAG INJ IV PUSH: CPT | Mod: XU

## 2024-06-03 PROCEDURE — 99223 1ST HOSP IP/OBS HIGH 75: CPT

## 2024-06-03 PROCEDURE — 36415 COLL VENOUS BLD VENIPUNCTURE: CPT

## 2024-06-03 PROCEDURE — 80053 COMPREHEN METABOLIC PANEL: CPT

## 2024-06-03 PROCEDURE — 83735 ASSAY OF MAGNESIUM: CPT

## 2024-06-03 PROCEDURE — 93010 ELECTROCARDIOGRAM REPORT: CPT

## 2024-06-03 PROCEDURE — 71045 X-RAY EXAM CHEST 1 VIEW: CPT

## 2024-06-03 RX ORDER — MYCOPHENOLATE MOFETIL 250 MG/1
500 CAPSULE ORAL
Refills: 0 | Status: DISCONTINUED | OUTPATIENT
Start: 2024-06-03 | End: 2024-06-03

## 2024-06-03 RX ORDER — AMLODIPINE BESYLATE 2.5 MG/1
10 TABLET ORAL ONCE
Refills: 0 | Status: DISCONTINUED | OUTPATIENT
Start: 2024-06-03 | End: 2024-06-03

## 2024-06-03 RX ORDER — MAGNESIUM SULFATE 500 MG/ML
2 VIAL (ML) INJECTION ONCE
Refills: 0 | Status: COMPLETED | OUTPATIENT
Start: 2024-06-03 | End: 2024-06-03

## 2024-06-03 RX ORDER — METOPROLOL TARTRATE 50 MG
50 TABLET ORAL ONCE
Refills: 0 | Status: COMPLETED | OUTPATIENT
Start: 2024-06-03 | End: 2024-06-03

## 2024-06-03 RX ORDER — ASPIRIN/CALCIUM CARB/MAGNESIUM 324 MG
1 TABLET ORAL
Qty: 30 | Refills: 0
Start: 2024-06-03 | End: 2024-07-02

## 2024-06-03 RX ORDER — MYCOPHENOLATE MOFETIL 250 MG/1
2 CAPSULE ORAL
Refills: 0 | DISCHARGE

## 2024-06-03 RX ORDER — LOSARTAN/HYDROCHLOROTHIAZIDE 100MG-25MG
1 TABLET ORAL
Refills: 0 | DISCHARGE

## 2024-06-03 RX ORDER — ATORVASTATIN CALCIUM 80 MG/1
1 TABLET, FILM COATED ORAL
Qty: 30 | Refills: 0
Start: 2024-06-03 | End: 2024-07-02

## 2024-06-03 RX ORDER — AMLODIPINE BESYLATE 2.5 MG/1
1 TABLET ORAL
Refills: 0 | DISCHARGE

## 2024-06-03 RX ORDER — ACETAMINOPHEN 500 MG
650 TABLET ORAL ONCE
Refills: 0 | Status: COMPLETED | OUTPATIENT
Start: 2024-06-03 | End: 2024-06-03

## 2024-06-03 RX ORDER — OLMESARTAN MEDOXOMIL 5 MG/1
1 TABLET, FILM COATED ORAL
Qty: 0 | Refills: 0 | DISCHARGE

## 2024-06-03 RX ADMIN — MYCOPHENOLATE MOFETIL 500 MILLIGRAM(S): 250 CAPSULE ORAL at 09:03

## 2024-06-03 RX ADMIN — Medication 25 GRAM(S): at 06:02

## 2024-06-03 RX ADMIN — Medication 650 MILLIGRAM(S): at 06:01

## 2024-06-03 RX ADMIN — Medication 50 MILLIGRAM(S): at 09:03

## 2024-06-03 NOTE — ED CDU PROVIDER INITIAL DAY NOTE - ATTENDING APP SHARED VISIT CONTRIBUTION OF CARE
59 yr old f w/ a pmh significant for htn, hld, autoimmune hepatitis who presents with chest pain. Pt states that yesterday evening she developed substernal chest pain. Pt denies any sob, nausea, vomiting, fevers, chills or any other medical complaints.     VITAL SIGNS: I have reviewed nursing notes and confirm.  CONSTITUTIONAL: non-toxic, well appearing  SKIN: no rash, no petechiae.  EYES:  EOMI, pink conjunctiva, anicteric  ENT: tongue midline, no exudates, MMM  NECK: Supple; no meningismus, no JVD  CARD: RRR, no murmurs, equal radial pulses bilaterally 2+  RESP: CTAB, no respiratory distress  ABD: Soft, non-tender, non-distended, no peritoneal signs, no HSM, no CVA tenderness

## 2024-06-03 NOTE — ED PROVIDER NOTE - ATTENDING APP SHARED VISIT CONTRIBUTION OF CARE
59 yr old f w/ a pmh significant for htn, hld, autoimmune hepatitis who presents with chest pain. Pt states that yesterday evening she developed substernal chest pain. Pt denies any sob, nausea, vomiting, fevers, chills or any other medical complaints.     VITAL SIGNS: I have reviewed nursing notes and confirm.  CONSTITUTIONAL: non-toxic, well appearing  SKIN: no rash, no petechiae.  EYES:  EOMI, pink conjunctiva, anicteric  ENT: tongue midline, no exudates, MMM  NECK: Supple; no meningismus, no JVD  CARD: RRR, no murmurs, equal radial pulses bilaterally 2+  RESP: CTAB, no respiratory distress  ABD: Soft, non-tender, non-distended, no peritoneal signs, no HSM, no CVA tenderness    59 yr old f that presents with chest pain, labs, ekg, imaging, pain management. reassess. dispo pending.

## 2024-06-03 NOTE — ED CDU PROVIDER INITIAL DAY NOTE - PHYSICAL EXAMINATION
Physical Exam    Vital Signs: I have reviewed the initial vital signs.  Constitutional: well-nourished, appears stated age, no acute distress  Eyes: Conjunctiva pink, Sclera clear  Cardiovascular: S1 and S2, regular rate, regular rhythm, well-perfused extremities, radial pulses equal and 2+ b/l.   Respiratory: unlabored respiratory effort, clear to auscultation bilaterally no wheezing, rales and rhonchi. pt is speaking full sentences. no accessory muscle use. no chest wall crepitus or tenderness. no flail chest.   Gastrointestinal: soft, non-tender, nondistended abdomen, no pulsatile mass, no rebound, no guarding  Musculoskeletal: FROM of b/l upper and lower extremities, no lower extremity edema, no calf tenderness  Integumentary: warm, dry, no rash  Neurologic: awake, alert, steady gait.   Psychiatric: appropriate mood, appropriate affect

## 2024-06-03 NOTE — ED CDU PROVIDER DISPOSITION NOTE - PATIENT PORTAL LINK FT
You can access the FollowMyHealth Patient Portal offered by [facility] by registering at the following website: [email or web address]. By joining [facility]’s FollowMyHealth portal, you will also be able to view your health information using other applications (apps) compatible with our system.

## 2024-06-03 NOTE — ED PROVIDER NOTE - CLINICAL SUMMARY MEDICAL DECISION MAKING FREE TEXT BOX
59 yr old f that presents with chest pain, labs, ekg, imaging, pain management. Labs and EKG were ordered and reviewed.  Imaging was ordered and reviewed by me.  Appropriate medications for patient's presenting complaints were ordered and effects were reassessed.  Patient's records (prior hospital, ED visit, and/or nursing home notes if available) were reviewed.  Additional history was obtained from EMS, family, and/or PCP (where available).  Escalation to admission/observation was considered.  Pt placed in obs for acs evaluation.

## 2024-06-03 NOTE — ED CDU PROVIDER INITIAL DAY NOTE - OBJECTIVE STATEMENT
59-year-old female with a past medical history of autoimmune hepatitis, hypertension, and prediabetes presents to the ED for evaluation of midsternal chest discomfort that began at 9:30 PM yesterday.  Patient reports she feels a more when she moves her body.  Patient reports yesterday she did lift a heavy bag.  Patient reports she follows cardiologist Dr. Hugo and was last seen around August 2023 and had an echo.  Patient denies fever, chills, cough, recent viral illness, neck pain, arm pain, back pain, jaw pain, shortness of breath, abdominal pain, nausea, vomiting, diarrhea, constipation, leg pain, leg swelling, recent travel, recent travel, recent surgeries, recent hospitalizations, history of cancer, use of hormones, history of blood clots, cigarette smoking, immediate fhx of CAD, illicit drug use, or use of alcohol. 59-year-old female with a past medical history of autoimmune hepatitis, hypertension, and prediabetes presents to the ED for evaluation of midsternal chest discomfort that began at 9:30 PM yesterday.  Patient reports she feels a more when she moves her body.  Patient reports yesterday she did lift a heavy bag.  Patient reports she follows cardiologist Dr. Hugo and was last seen around August 2023 and had an echo.  Patient denies fever, chills, cough, recent viral illness, neck pain, arm pain, back pain, jaw pain, shortness of breath, abdominal pain, nausea, vomiting, diarrhea, constipation, leg pain, leg swelling, recent travel, recent travel, recent surgeries, recent hospitalizations, history of cancer, use of hormones, history of blood clots, cigarette smoking, immediate fhx of CAD, illicit drug use, or use of alcohol. plan for pt to get ccta and needs dose of metoprolol to lower heart rate. will hold pt amlodipine and losartan because she is getting metoprolol for ccta.

## 2024-06-03 NOTE — ED PROVIDER NOTE - OBJECTIVE STATEMENT
59-year-old female with a past medical history of autoimmune hepatitis, hypertension, and prediabetes presents to the ED for evaluation of midsternal chest discomfort that began at 9:30 PM yesterday.  Patient reports she feels a more when she moves her body.  Patient reports yesterday she did lift a heavy bag.  Patient reports she follows cardiologist Dr. Hugo and was last seen around August 2023 and had an echo.  Patient denies fever, chills, cough, recent viral illness, neck pain, arm pain, back pain, jaw pain, shortness of breath, abdominal pain, nausea, vomiting, diarrhea, constipation, leg pain, leg swelling, recent travel, recent travel, recent surgeries, recent hospitalizations, history of cancer, use of hormones, history of blood clots, cigarette smoking, immediate fhx of CAD, illicit drug use, or use of alcohol.

## 2024-06-03 NOTE — ED CDU PROVIDER INITIAL DAY NOTE - PROGRESS NOTE DETAILS
Patient resting comfortably, awaiting CCTA. No active chest pain. Patient given SL NTG x2 at Ct, awaiting results.

## 2024-06-03 NOTE — ED CDU PROVIDER DISPOSITION NOTE - CLINICAL COURSE
Patient put into observation for cardiac testing.  CCTA shows CAD RADS 1.  Patient counseled on further follow-up with cardiology.  Patient otherwise did not have further episodes of chest pain.  Patient discharged.

## 2024-06-13 ENCOUNTER — TRANSCRIPTION ENCOUNTER (OUTPATIENT)
Age: 59
End: 2024-06-13

## 2024-07-01 ENCOUNTER — RESULT CHARGE (OUTPATIENT)
Age: 59
End: 2024-07-01

## 2024-07-01 ENCOUNTER — NON-APPOINTMENT (OUTPATIENT)
Age: 59
End: 2024-07-01

## 2024-07-02 ENCOUNTER — NON-APPOINTMENT (OUTPATIENT)
Age: 59
End: 2024-07-02

## 2024-07-02 ENCOUNTER — APPOINTMENT (OUTPATIENT)
Dept: CARDIOLOGY | Facility: CLINIC | Age: 59
End: 2024-07-02
Payer: COMMERCIAL

## 2024-07-02 VITALS
HEART RATE: 62 BPM | SYSTOLIC BLOOD PRESSURE: 140 MMHG | BODY MASS INDEX: 33.66 KG/M2 | DIASTOLIC BLOOD PRESSURE: 88 MMHG | WEIGHT: 190 LBS | HEIGHT: 63 IN

## 2024-07-02 DIAGNOSIS — I10 ESSENTIAL (PRIMARY) HYPERTENSION: ICD-10-CM

## 2024-07-02 DIAGNOSIS — I25.10 ATHEROSCLEROTIC HEART DISEASE OF NATIVE CORONARY ARTERY W/OUT ANGINA PECTORIS: ICD-10-CM

## 2024-07-02 DIAGNOSIS — Z00.00 ENCOUNTER FOR GENERAL ADULT MEDICAL EXAMINATION W/OUT ABNORMAL FINDINGS: ICD-10-CM

## 2024-07-02 DIAGNOSIS — R00.2 PALPITATIONS: ICD-10-CM

## 2024-07-02 PROCEDURE — 99214 OFFICE O/P EST MOD 30 MIN: CPT | Mod: 25

## 2024-07-02 PROCEDURE — 93000 ELECTROCARDIOGRAM COMPLETE: CPT

## 2024-07-02 RX ORDER — ASPIRIN 81 MG
81 TABLET, DELAYED RELEASE (ENTERIC COATED) ORAL DAILY
Refills: 0 | Status: ACTIVE | COMMUNITY

## 2025-01-08 ENCOUNTER — NON-APPOINTMENT (OUTPATIENT)
Age: 60
End: 2025-01-08

## 2025-01-08 ENCOUNTER — APPOINTMENT (OUTPATIENT)
Dept: CARDIOLOGY | Facility: CLINIC | Age: 60
End: 2025-01-08
Payer: COMMERCIAL

## 2025-01-08 VITALS
DIASTOLIC BLOOD PRESSURE: 84 MMHG | SYSTOLIC BLOOD PRESSURE: 138 MMHG | TEMPERATURE: 97.1 F | BODY MASS INDEX: 33.49 KG/M2 | HEART RATE: 85 BPM | HEIGHT: 63 IN | WEIGHT: 189 LBS

## 2025-01-08 DIAGNOSIS — E78.5 HYPERLIPIDEMIA, UNSPECIFIED: ICD-10-CM

## 2025-01-08 DIAGNOSIS — R73.03 PREDIABETES.: ICD-10-CM

## 2025-01-08 DIAGNOSIS — I10 ESSENTIAL (PRIMARY) HYPERTENSION: ICD-10-CM

## 2025-01-08 PROCEDURE — 99213 OFFICE O/P EST LOW 20 MIN: CPT | Mod: 25

## 2025-01-08 PROCEDURE — 93000 ELECTROCARDIOGRAM COMPLETE: CPT

## 2025-01-08 RX ORDER — ATORVASTATIN CALCIUM 10 MG/1
10 TABLET, FILM COATED ORAL DAILY
Refills: 0 | Status: ACTIVE | COMMUNITY

## 2025-01-08 RX ORDER — MYCOPHENOLATE MOFETIL 500 MG/1
500 TABLET ORAL TWICE DAILY
Refills: 0 | Status: ACTIVE | COMMUNITY

## 2025-06-09 ENCOUNTER — RX RENEWAL (OUTPATIENT)
Age: 60
End: 2025-06-09